# Patient Record
Sex: MALE | Race: WHITE | NOT HISPANIC OR LATINO | Employment: FULL TIME | URBAN - METROPOLITAN AREA
[De-identification: names, ages, dates, MRNs, and addresses within clinical notes are randomized per-mention and may not be internally consistent; named-entity substitution may affect disease eponyms.]

---

## 2019-02-14 ENCOUNTER — TREATMENT (OUTPATIENT)
Dept: PHYSICAL THERAPY | Facility: CLINIC | Age: 44
End: 2019-02-14

## 2019-02-14 DIAGNOSIS — M25.511 ACUTE PAIN OF RIGHT SHOULDER: Primary | ICD-10-CM

## 2019-02-14 DIAGNOSIS — M75.41 SHOULDER IMPINGEMENT SYNDROME, RIGHT: ICD-10-CM

## 2019-02-14 DIAGNOSIS — M75.21 BICEPS TENDINITIS OF RIGHT SHOULDER: ICD-10-CM

## 2019-02-14 PROCEDURE — DRYNDL PR CUSTOM DRY NEEDLING SELF PAY: Performed by: PHYSICAL THERAPIST

## 2019-02-14 PROCEDURE — 97161 PT EVAL LOW COMPLEX 20 MIN: CPT | Performed by: PHYSICAL THERAPIST

## 2019-02-14 PROCEDURE — 97530 THERAPEUTIC ACTIVITIES: CPT | Performed by: PHYSICAL THERAPIST

## 2019-02-14 NOTE — PROGRESS NOTES
Physical Therapy Initial Evaluation and Plan of Care    Patient: Jose Barber   : 1975  Diagnosis/ICD-10 Code:  Acute pain of right shoulder [M25.511]  Referring practitioner: Self Referring    Subjective Evaluation    History of Present Illness  Date of onset: 2018  Mechanism of injury: Pt reports doing a lot of burpees, pull-ups, and push-ups in Performance Training class and then helped sister move in the same day.  Pt reports moving a dresser from the third drawer and tried to catch it from falling.      PMH: broke right collar bone 3 years ago; TIA      Precautions and Work Restrictions: pressing with less weightPain  Current pain rating: 3  At best pain ratin (complete rest)  At worst pain ratin  Location: anterior right shoulder   Quality: dull ache  Relieving factors: rest (Aleve)  Exacerbated by: overhead presses, close- press.  Progression: improved    Hand dominance: right    Diagnostic Tests  No diagnostic tests performed    Patient Goals  Patient goals for therapy: decreased pain and return to sport/leisure activities             Objective       Tenderness     Right Shoulder  Tenderness in the biceps tendon (proximal). No tenderness in the acromion, clavicle, infraspinatus tendon, subscapularis tendon and supraspinatus tendon.     Active Range of Motion     Right Shoulder   Abduction: Right shoulder active abduction: pre-dry needlin degrees with pain; post-dry needling 180 deg with minimal discomfort.     Strength/Myotome Testing     Right Shoulder     Planes of Motion   Flexion: 5   Abduction: 4+   External rotation at 45°: 5   Internal rotation at 45°: 5     Tests     Right Shoulder   Positive jerk and Priya.   Negative AC shear, active compression (Clayton), empty can, Hawkin's and passive horizontal adduction.     Additional Tests Details  Biceps Load and Biceps Load II (+) Right         Assessment & Plan     Assessment  Impairments: abnormal or restricted ROM, impaired  physical strength, lacks appropriate home exercise program and pain with function  Assessment details: Pt is active 43 y.o. Male who presents to physical therapy with right shoulder pain since December.  Pt demonstrated limitation in AROM shoulder abduction prior to dry needling, pain with shoulder flexion and abduction and pain with labral special testing.  Pt education on pathology and involved anatomy and recommendations made to hold on overhead pressing at this time.  Pt education on modifying exercises that increase pain and increased attention to scapular stabilization exercises.  Pt requires continued skilled physical therapy to address impairments and return to prior level of function including lifting at work and weight lifting without increased pain or difficulty.  Functional Limitations: carrying objects, lifting, pulling, pushing and uncomfortable because of pain  Goals  Plan Goals: Short - Term Goals - In 2 weeks:  1. Pt will be (I) with initial HEP.  2. Right shoulder AROM flexion and abduction 180 degrees without pain in order to perform overhead reaching and lifting.    Long - Term Goals - In 4 weeks:  1. Right shoulder flexion and abduction 5/5 without pain.  2. Pt will resume normal weight lifting with all movements and normal weight without increased pain during or after activity.  3. QuickDASH score 0% to demonstrate functional improvement.     Plan  Therapy options: will be seen for skilled physical therapy services  Planned modality interventions: cryotherapy, electrical stimulation/Russian stimulation and thermotherapy (hydrocollator packs)  Planned therapy interventions: body mechanics training, functional ROM exercises, home exercise program, manual therapy, neuromuscular re-education, strengthening and therapeutic activities  Duration in visits: 6  Treatment plan discussed with: patient        Manual Therapy:    -     mins  14865;  Therapeutic Exercise:    -     mins  19028;      Neuromuscular Evelia:    -    mins  31518;    Therapeutic Activity:     10     mins  63388;     Gait Training:      -     mins  26298;     Ultrasound:     -     mins  95596;    Electrical Stimulation:    -     mins  52850 ( );  Dry Needling     10     mins self-pay    Timed Treatment:   10   mins   Total Treatment:     40   mins    PT SIGNATURE: Alessandra Marinelli, PT DPT   DATE TREATMENT INITIATED: 2/15/2019    Initial Certification  Certification Period: 5/16/2019  I certify that the therapy services are furnished while this patient is under my care.  The services outlined above are required by this patient, and will be reviewed every 90 days.     PHYSICIAN:       DATE:     Please sign and return via fax to [phone]. Thank you, [facility].

## 2019-02-15 NOTE — PATIENT INSTRUCTIONS
Please view [facility] Jose Barber for a complete list of HEP instructions.  Dry needling consent reviewed and signed; side effects discussed and agreed to

## 2019-02-27 ENCOUNTER — TREATMENT (OUTPATIENT)
Dept: PHYSICAL THERAPY | Facility: CLINIC | Age: 44
End: 2019-02-27

## 2019-02-27 DIAGNOSIS — M25.511 ACUTE PAIN OF RIGHT SHOULDER: Primary | ICD-10-CM

## 2019-02-27 DIAGNOSIS — M75.21 BICEPS TENDINITIS OF RIGHT SHOULDER: ICD-10-CM

## 2019-02-27 DIAGNOSIS — M75.41 SHOULDER IMPINGEMENT SYNDROME, RIGHT: ICD-10-CM

## 2019-02-27 PROCEDURE — 97014 ELECTRIC STIMULATION THERAPY: CPT | Performed by: PHYSICAL THERAPIST

## 2019-02-27 PROCEDURE — DRYNDL PR CUSTOM DRY NEEDLING SELF PAY: Performed by: PHYSICAL THERAPIST

## 2019-02-27 PROCEDURE — 97140 MANUAL THERAPY 1/> REGIONS: CPT | Performed by: PHYSICAL THERAPIST

## 2019-02-27 PROCEDURE — 97110 THERAPEUTIC EXERCISES: CPT | Performed by: PHYSICAL THERAPIST

## 2019-02-27 NOTE — PROGRESS NOTES
Physical Therapy Daily Progress Note    Jose Barber reports: shoulder is about the same and will feel ok, but occasionally increased anterior shoulder pain after workouts.      Subjective     Objective       Tenderness     Right Shoulder  Tenderness in the biceps tendon (proximal), bicipital groove and subscapularis tendon.     Passive Range of Motion     Right Shoulder   Flexion: 180 degrees   Abduction: 180 degrees      See Exercise, Manual, and Modality Logs for complete treatment.       Assessment & Plan     Assessment  Assessment details: Pt demonstrates continued c/o pain and associated tenderness at right biceps tendon and supraspinatus tendon.  Pt demonstrates full shoulder PROM, but continued pain and limitations with right shoulder active and resisted activities.  Initiated additional scapular strengthening and stabilization exercises including serratus anterior activation.  Pt tolerated treatment well without increased pain and dry needling continued due to previous favorable response.        Progress strengthening /stabilization /functional activity           Manual Therapy:    10     mins  91240;  Therapeutic Exercise:    15     mins  77592;     Neuromuscular Evelia:    -    mins  22025;    Therapeutic Activity:     -     mins  60237;     Gait Training:      -     mins  78800;     Ultrasound:     -     mins  05802;    Electrical Stimulation:    15     mins  97415 ( );  Dry Needling     10     mins self-pay    Timed Treatment:   25   mins   Total Treatment:     50   mins    Alessandra Marinelli, PT DPT  Physical Therapist

## 2019-03-08 ENCOUNTER — TREATMENT (OUTPATIENT)
Dept: PHYSICAL THERAPY | Facility: CLINIC | Age: 44
End: 2019-03-08

## 2019-03-08 DIAGNOSIS — M25.511 ACUTE PAIN OF RIGHT SHOULDER: Primary | ICD-10-CM

## 2019-03-08 DIAGNOSIS — M75.41 SHOULDER IMPINGEMENT SYNDROME, RIGHT: ICD-10-CM

## 2019-03-08 DIAGNOSIS — M75.21 BICEPS TENDINITIS OF RIGHT SHOULDER: ICD-10-CM

## 2019-03-08 PROCEDURE — 97140 MANUAL THERAPY 1/> REGIONS: CPT | Performed by: PHYSICAL THERAPIST

## 2019-03-08 PROCEDURE — 97112 NEUROMUSCULAR REEDUCATION: CPT | Performed by: PHYSICAL THERAPIST

## 2019-03-08 PROCEDURE — 97014 ELECTRIC STIMULATION THERAPY: CPT | Performed by: PHYSICAL THERAPIST

## 2019-03-08 PROCEDURE — 97110 THERAPEUTIC EXERCISES: CPT | Performed by: PHYSICAL THERAPIST

## 2019-03-08 PROCEDURE — DRYNDL PR CUSTOM DRY NEEDLING SELF PAY: Performed by: PHYSICAL THERAPIST

## 2019-03-08 NOTE — PROGRESS NOTES
Physical Therapy Daily Progress Note    Jose Sunil reports: felt good after last session but pain started again after workouts.  Pt reports pain is still in anterior shoulder.      Subjective     Objective       Tenderness     Right Shoulder  Tenderness in the acromion and biceps tendon (proximal).      See Exercise, Manual, and Modality Logs for complete treatment.       Assessment & Plan     Assessment  Assessment details: Initiated additional scapular strengthening and shoulder strengthening today.  Pt demonstrated mild fatigue and difficulty and no increased pain.  Pt is responding well to physical therapy with short - term gains, but limited ability to maintain gains inter-session due to exercise activity.  Pt demonstrates some ability to modify strength training to decrease stress on right shoulder.        Progress strengthening /stabilization /functional activity  Update HEP as indicated         Manual Therapy:    10     mins  62427;  Therapeutic Exercise:   10      mins  86020;     Neuromuscular Evelia:    10    mins  31663;    Therapeutic Activity:     -     mins  75625;     Gait Training:      -     mins  70919;     Ultrasound:     -     mins  33565;    Electrical Stimulation:    15     mins  77314 ( );  Dry Needling     10     mins self-pay    Timed Treatment:   30   mins   Total Treatment:     55   mins    Alessandra Marinelli, PT DPT  Physical Therapist

## 2019-03-15 ENCOUNTER — TREATMENT (OUTPATIENT)
Dept: PHYSICAL THERAPY | Facility: CLINIC | Age: 44
End: 2019-03-15

## 2019-03-15 DIAGNOSIS — M75.41 SHOULDER IMPINGEMENT SYNDROME, RIGHT: ICD-10-CM

## 2019-03-15 DIAGNOSIS — M75.21 BICEPS TENDINITIS OF RIGHT SHOULDER: ICD-10-CM

## 2019-03-15 DIAGNOSIS — M25.511 ACUTE PAIN OF RIGHT SHOULDER: Primary | ICD-10-CM

## 2019-03-15 PROCEDURE — DRYNDL PR CUSTOM DRY NEEDLING SELF PAY: Performed by: PHYSICAL THERAPIST

## 2019-03-15 PROCEDURE — 97112 NEUROMUSCULAR REEDUCATION: CPT | Performed by: PHYSICAL THERAPIST

## 2019-03-15 PROCEDURE — 97014 ELECTRIC STIMULATION THERAPY: CPT | Performed by: PHYSICAL THERAPIST

## 2019-03-15 PROCEDURE — 97110 THERAPEUTIC EXERCISES: CPT | Performed by: PHYSICAL THERAPIST

## 2019-03-15 NOTE — PROGRESS NOTES
Physical Therapy Daily Progress Note    Jose Barber reports: doing well, still pain in anterior right shoulder and sometimes in right upper trapezius at night.     Subjective     Objective   See Exercise, Manual, and Modality Logs for complete treatment.       Assessment & Plan     Assessment  Assessment details: Treatment session focused on scapular strengthening and stabilization.  Pt tolerated treatment session well and required only minimal cues for scapular retraction and depression.  Pt demonstrates good progress towards goals and decreased intensity and frequency of pain.          Progress strengthening /stabilization /functional activity  Re-assess objective measures         Manual Therapy:    -     mins  54288;  Therapeutic Exercise:    22     mins  25446;     Neuromuscular Evelia:    8    mins  76838;    Therapeutic Activity:     -     mins  36700;     Gait Training:      -     mins  18147;     Ultrasound:     -     mins  51041;    Electrical Stimulation:    15     mins  53560 ( );  Dry Needling     10     mins self-pay    Timed Treatment:   30   mins   Total Treatment:     55   mins    Alessandra Marinelli, PT DPT  Physical Therapist

## 2019-03-22 ENCOUNTER — TREATMENT (OUTPATIENT)
Dept: PHYSICAL THERAPY | Facility: CLINIC | Age: 44
End: 2019-03-22

## 2019-03-22 DIAGNOSIS — M75.21 BICEPS TENDINITIS OF RIGHT SHOULDER: ICD-10-CM

## 2019-03-22 DIAGNOSIS — M75.41 SHOULDER IMPINGEMENT SYNDROME, RIGHT: ICD-10-CM

## 2019-03-22 DIAGNOSIS — M25.511 ACUTE PAIN OF RIGHT SHOULDER: Primary | ICD-10-CM

## 2019-03-22 PROCEDURE — 97110 THERAPEUTIC EXERCISES: CPT | Performed by: PHYSICAL THERAPIST

## 2019-03-22 PROCEDURE — 97140 MANUAL THERAPY 1/> REGIONS: CPT | Performed by: PHYSICAL THERAPIST

## 2019-03-22 PROCEDURE — DRYNDL PR CUSTOM DRY NEEDLING SELF PAY: Performed by: PHYSICAL THERAPIST

## 2019-03-22 PROCEDURE — 97014 ELECTRIC STIMULATION THERAPY: CPT | Performed by: PHYSICAL THERAPIST

## 2019-03-22 NOTE — PROGRESS NOTES
Physical Therapy Daily Progress Note    Jose Sunil reports: moved a lot of weight during home repairs and overall shoulder is doing well.  Pt reports occasional pain on top of right shoulder.      Subjective     Objective       Tenderness     Right Shoulder  Tenderness in the biceps tendon (proximal).      See Exercise, Manual, and Modality Logs for complete treatment.       Assessment & Plan     Assessment  Assessment details: Pt demonstrates decreased right shoulder pain and improved tolerance to activity.  Pt education on core training and planes of motion as well as stability exercises to activate and strengthen local core muscles.  Pt demonstrates good understanding and progress and is appropriate for decreased frequency of PT visits to as needed with focus on manual therapy.          Progress per Plan of Care  Focus on manual therapy with continued HEP performance         Manual Therapy:    10     mins  31117;  Therapeutic Exercise:    10     mins  61800;     Neuromuscular Evelia:    -    mins  82559;    Therapeutic Activity:     -     mins  89495;     Gait Training:      -     mins  58379;     Ultrasound:     -     mins  04793;    Electrical Stimulation:    15     mins  30114 ( );  Dry Needling     10     mins self-pay    Timed Treatment:   20   mins   Total Treatment:     45   mins    Alessandra Marinelli, PT DPT  Physical Therapist

## 2019-12-20 ENCOUNTER — OFFICE VISIT (OUTPATIENT)
Dept: FAMILY MEDICINE CLINIC | Facility: CLINIC | Age: 44
End: 2019-12-20

## 2019-12-20 VITALS
HEART RATE: 68 BPM | OXYGEN SATURATION: 98 % | BODY MASS INDEX: 28.29 KG/M2 | HEIGHT: 69 IN | SYSTOLIC BLOOD PRESSURE: 141 MMHG | DIASTOLIC BLOOD PRESSURE: 89 MMHG | WEIGHT: 191 LBS | RESPIRATION RATE: 16 BRPM | TEMPERATURE: 98.6 F

## 2019-12-20 DIAGNOSIS — I74.9 TIA DUE TO EMBOLISM (HCC): Primary | ICD-10-CM

## 2019-12-20 DIAGNOSIS — Z23 ENCOUNTER FOR ADMINISTRATION OF VACCINE: ICD-10-CM

## 2019-12-20 DIAGNOSIS — I77.74 VERTEBRAL ARTERY DISSECTION (HCC): ICD-10-CM

## 2019-12-20 DIAGNOSIS — I10 HYPERTENSION, UNSPECIFIED TYPE: ICD-10-CM

## 2019-12-20 DIAGNOSIS — G45.9 TIA DUE TO EMBOLISM (HCC): Primary | ICD-10-CM

## 2019-12-20 PROBLEM — Z98.890 S/P ACL REPAIR: Status: ACTIVE | Noted: 2018-04-02

## 2019-12-20 PROCEDURE — 90632 HEPA VACCINE ADULT IM: CPT | Performed by: FAMILY MEDICINE

## 2019-12-20 PROCEDURE — 90471 IMMUNIZATION ADMIN: CPT | Performed by: FAMILY MEDICINE

## 2019-12-20 PROCEDURE — 99203 OFFICE O/P NEW LOW 30 MIN: CPT | Performed by: FAMILY MEDICINE

## 2019-12-20 RX ORDER — DESONIDE 0.5 MG/G
CREAM TOPICAL 2 TIMES DAILY
COMMUNITY
Start: 2019-09-30

## 2019-12-20 RX ORDER — PIMECROLIMUS 10 MG/G
CREAM TOPICAL 2 TIMES DAILY
COMMUNITY
Start: 2019-09-30 | End: 2020-05-07 | Stop reason: SDUPTHER

## 2019-12-20 RX ORDER — ASPIRIN 81 MG/1
81 TABLET ORAL DAILY
COMMUNITY

## 2019-12-20 RX ORDER — PIMECROLIMUS 10 MG/G
CREAM TOPICAL
COMMUNITY
Start: 2018-02-13

## 2020-01-21 ENCOUNTER — OFFICE VISIT (OUTPATIENT)
Dept: NEUROLOGY | Facility: CLINIC | Age: 45
End: 2020-01-21

## 2020-01-21 VITALS
BODY MASS INDEX: 28.14 KG/M2 | DIASTOLIC BLOOD PRESSURE: 88 MMHG | WEIGHT: 190 LBS | SYSTOLIC BLOOD PRESSURE: 128 MMHG | OXYGEN SATURATION: 98 % | HEIGHT: 69 IN | HEART RATE: 75 BPM

## 2020-01-21 DIAGNOSIS — I74.9 TIA DUE TO EMBOLISM (HCC): ICD-10-CM

## 2020-01-21 DIAGNOSIS — G45.9 TIA DUE TO EMBOLISM (HCC): ICD-10-CM

## 2020-01-21 DIAGNOSIS — I77.74 VERTEBRAL ARTERY DISSECTION (HCC): ICD-10-CM

## 2020-01-21 DIAGNOSIS — G45.9 TIA (TRANSIENT ISCHEMIC ATTACK): Primary | ICD-10-CM

## 2020-01-21 PROCEDURE — 99204 OFFICE O/P NEW MOD 45 MIN: CPT | Performed by: NURSE PRACTITIONER

## 2020-01-21 RX ORDER — CLOBETASOL PROPIONATE 0.5 MG/G
CREAM TOPICAL 2 TIMES DAILY
COMMUNITY
Start: 2020-01-13

## 2020-01-21 NOTE — PATIENT INSTRUCTIONS
"Transient Ischemic Attack  A transient ischemic attack (TIA) is a \"warning stroke\" that causes stroke-like symptoms that then go away quickly. The symptoms of a TIA come on suddenly, and they last less than 24 hours. Unlike a stroke, a TIA does not cause permanent damage to the brain. It is important to know the symptoms of a TIA and what to do. Seek medical care right away, even if your symptoms go away.  Having a TIA is a sign that you are at higher risk for a permanent stroke. Lifestyle changes and medical treatments can help prevent a stroke.  What are the causes?  This condition is caused by a temporary blockage in an artery in the head or neck. The blockage does not allow the brain to get the blood supply it needs and can cause various symptoms. The blockage can be caused by:  · Fatty buildup in an artery in the head or neck (atherosclerosis).  · A blood clot.  · Tearing of an artery (dissection).  · Inflammation of an artery (vasculitis).  Sometimes the cause is not known.  What increases the risk?  Certain factors may make you more likely to develop this condition. Some of these factors are things that you can change, such as:  · Obesity.  · Using products that contain nicotine or tobacco, such as cigarettes and e-cigarettes.  · Taking oral birth control, especially if you also use tobacco.  · Lack of physical inactivity.  · Excessive use of alcohol.  · Use of drugs, especially cocaine and methamphetamine.  Other risk factors include:  · High blood pressure (hypertension).  · High cholesterol.  · Diabetes mellitus.  · Heart disease (coronary artery disease).  · Atrial fibrillation.  · Being  or .  · Being over the age of 60.  · Being male.  · Family history of stroke.  · Previous history of blood clots, stroke, TIA, or heart attack.  · Sickle cell disease.  · Being a woman with a history of preeclampsia.  · Migraine headache.  · Sleep apnea.  · Chronic inflammatory diseases, such as " rheumatoid arthritis or lupus.  · Blood clotting disorders (hypercoagulable state).  What are the signs or symptoms?  Symptoms of this condition are the same as those of a stroke, but they are temporary. The symptoms develop suddenly, and they go away quickly, usually within minutes to hours. Symptoms may include sudden:  · Weakness or numbness in your face, arm, or leg, especially on one side of your body.  · Trouble walking or difficulty moving your arms or legs.  · Trouble speaking, understanding speech, or both (aphasia).  · Vision changes in one or both eyes. These include double vision, blurred vision, or loss of vision.  · Dizziness.  · Confusion.  · Loss of balance or coordination.  · Nausea and vomiting.  · Severe headache with no known cause.  If possible, make note of the exact time that you last felt like your normal self and what time your symptoms started. Tell your health care provider.  How is this diagnosed?  This condition may be diagnosed based on:  · Your symptoms and medical history.  · A physical exam.  · Imaging tests, usually a CT or MRI scan of the brain.  · Blood tests.  You may also have other tests, including:  · Electrocardiogram (ECG).  · Echocardiogram.  · Carotid ultrasound.  · A scan of the brain circulation (CT angiogram or MRI angiogram).  · Continuous heart monitoring.  How is this treated?  The goal of treatment is to reduce the risk for a subsequent stroke. Treatment may include stroke prevention therapies such as:  · Changes to diet or lifestyle to decrease your risk. Lifestyle changes may include exercising and stopping smoking.  · Medicines to thin the blood (antiplatelets or anticoagulants).  · Blood pressure medicines.  · Medicines to reduce cholesterol.  · Treating other health conditions, such as diabetes or atrial fibrillation.  If testing shows that you have narrowing in the arteries to your brain, your health care provider may recommend a procedure, such as:  · Carotid  endarterectomy. This is a surgery to remove the blockage from your artery.  · Carotid angioplasty and stenting. This is a procedure to open or widen an artery in the neck using a metal mesh tube (stent). The stent helps keep the artery open by supporting the artery walls.  Follow these instructions at home:  Medicines    · Take over-the-counter and prescription medicines only as told by your health care provider.  · If you were told to take a medicine to thin your blood, such as aspirin or an anticoagulant, take it exactly as told by your health care provider.  ? Taking too much blood-thinning medicine can cause bleeding.  ? If you do not take enough blood-thinning medicine, you will not have the protection that you need against a stroke and other problems.  Eating and drinking    · Eat 5 or more servings of fruits and vegetables each day.  · Follow instructions from your health care provider about diet. You may need to follow a certain nutrition plan to help manage risk factors for stroke, such as high blood pressure, high cholesterol, diabetes, or obesity. This may include:  ? Eating a low-fat, low-salt diet.  ? Including a lot of fiber in your diet.  ? Limiting the amount of carbohydrates and sugar in your diet.  · Limit alcohol intake to no more than 1 drink a day for nonpregnant women and 2 drinks a day for men. One drink equals 12 oz of beer, 5 oz of wine, or 1½ oz of hard liquor.  General instructions  · Maintain a healthy weight.  · Stay physically active. Try to get at least 30 minutes of exercise on most or all days.  · Find out if you have sleep apnea, and seek treatment if needed.  · Do not use any products that contain nicotine or tobacco, such as cigarettes and e-cigarettes. If you need help quitting, ask your health care provider.  · Do not abuse drugs.  · Keep all follow-up visits as told by your health care provider. This is important.  Where to find more information  · American Stroke Association:  www.strokeassociation.org  · National Stroke Association: www.stroke.org  Get help right away if:  · You have chest pain or an irregular heartbeat.  · You have any symptoms of stroke. The acronym BEFAST is an easy way to remember the main warning signs of stroke.  ? B - Balance problems. Signs include dizziness, sudden trouble walking, or loss of balance.  ? E - Eye problems. This includes trouble seeing or a sudden change in vision.  ? F - Face changes. This includes sudden weakness or numbness of the face, or the face or eyelid drooping to one side.  ? A - Arm weakness or numbness. This happens suddenly and usually on one side of the body.  ? S - Speech problems. This includes trouble speaking or trouble understanding speech.  ? T - Time. Time to call 911 or seek emergency care. Do not wait to see if symptoms will go away. Make note of the time your symptoms started.  · Other signs of stroke may include:  ? A sudden, severe headache with no known cause.  ? Nausea or vomiting.  ? Seizure.  These symptoms may represent a serious problem that is an emergency. Do not wait to see if the symptoms will go away. Get medical help right away. Call your local emergency services (911 in the [place] Do not drive yourself to the hospital.  Summary  · A TIA happens when an artery in the head or neck is blocked, leading to stroke-like symptoms that then go away quickly. The blockage clears before there is any permanent brain damage. A TIA is a medical emergency and requires immediate medical attention.  · Symptoms of this condition are the same as those of a stroke, but they are temporary. The symptoms usually develop suddenly, and they go away quickly, usually within minutes to hours.  · Having a TIA means that you are at high risk of a stroke in the near future.  · Treatment may include medicines to thin the blood as well as medicines, diet changes, and lifestyle changes to manage conditions that increase the risk of another TIA  or a stroke.  This information is not intended to replace advice given to you by your health care provider. Make sure you discuss any questions you have with your health care provider.  Document Released: 09/27/2006 Document Revised: 06/25/2019 Document Reviewed: 01/30/2018  [employer] Interactive Patient Education © 2019 [employer]

## 2020-01-21 NOTE — PROGRESS NOTES
"DOS: 2020  NAME: Jose Barber   : 1975  PCP: Jean-Paul Levy MD    Chief Complaint   Patient presents with   • Transient Ischemic Attack      SUBJECTIVE  Neurological Problem:  44 y.o. RHW  male with \"borderline\" HTN, HLD, h/o basal cell carcinoma who presents today to establish care for stroke/TIA. He is unaccompanied. Patient and problem are new to examiner. History is provided by patient and review of records that are summarized below.       Interval History:   Mr. Barber reports he had a TIA in 2018, treated at [facility], no records currently available. He tells me he  was at a festival in [place], wearing be gras type beads, on way back to [place], he was, sleeping on plane, and on rough landing, his head snapped forward, work him up but \"couldn't see straight for 24-hours\", mild headache. Spouse reports he wasn't acting himself but thought was due to his trip. The following day he went to his regular cardio/weight class, had a nose bleed, was cleaning up and started choking, had a severe coughing spell and then went weak on the whole left side. He was taken to [facility] and symptoms had basically resolved except for some speech difficulties for the next several hours, he was sent by EMS to [facility] where he had several scans and was apparently seen by Dr. Cobb, endovascular neurologist, no hospital records currently available but f/u office notes 19 indicate he was diagnosed with vertebral artery dissection, hyperlipidemia and bilateral carotid artery stenosis. Per patient follow-up imaging showed resolution of the \"clot\". He has been on ASA since the event which he continues and was on a statin at one time, but not currently. Patient states he was told his event was due to an \"inherited disease\". He has recently established with a new PCP, progress notes dated 19 reviewed, he had questions regarding his diagnosis of TIA resulting in referral [facility].     He denies any " "residual symptoms or any previous or new stroke/TIA symptoms. He denies any headaches. He works out regularly. Denies any changes in his health. He does take his BP regularly, states he runs about the \"130s\", He denies any signs/symptoms of sleep apnea. No history of seizures, CNS infections. Remote h/o  \"cracked head open\" on pogo stick, no LOC, no concussion. He denies migraines.     He denies any family h/o blood or connective tissue disorders, stroke/TIA. Grandfathers that  of heart disease in 60s  He denies smoking. Rare alcohol use.     Review of Systems:Review of Systems   Constitutional: Negative for activity change, appetite change and fatigue.   HENT: Negative for ear pain, facial swelling and trouble swallowing.    Eyes: Negative for photophobia, pain and visual disturbance.   Respiratory: Negative for cough, chest tightness and shortness of breath.    Cardiovascular: Negative for chest pain, palpitations and leg swelling.   Gastrointestinal: Negative for abdominal pain, nausea and vomiting.   Endocrine: Negative for cold intolerance, heat intolerance and polydipsia.   Musculoskeletal: Negative for back pain, gait problem and neck pain.   Skin: Negative for color change, rash and wound.   Allergic/Immunologic: Negative for environmental allergies, food allergies and immunocompromised state.   Neurological: Negative for dizziness, tremors, seizures, syncope, facial asymmetry, speech difficulty, weakness, light-headedness, numbness and headaches.   Hematological: Negative for adenopathy. Does not bruise/bleed easily.   Psychiatric/Behavioral: Negative for agitation, behavioral problems, confusion, decreased concentration, dysphoric mood, hallucinations, self-injury, sleep disturbance and suicidal ideas. The patient is not nervous/anxious and is not hyperactive.     Above ROS reviewed    The following portions of the patient's history were reviewed and updated as appropriate: allergies, current " medications, past family history, past medical history, past social history, past surgical history and problem list.    Current Medications:   Current Outpatient Medications:   •  aspirin 81 MG EC tablet, Take 81 mg by mouth Daily., Disp: , Rfl:   •  clobetasol (TEMOVATE) 0.05 % cream, Apply  topically to the appropriate area as directed 2 (Two) Times a Day., Disp: , Rfl:   •  desonide (DESOWEN) 0.05 % cream, Apply  topically to the appropriate area as directed 2 (Two) Times a Day., Disp: , Rfl:   •  Multiple Vitamin (MULTI VITAMIN MENS PO), Take  by mouth., Disp: , Rfl:   •  pimecrolimus (ELIDEL) 1 % cream, Apply  topically to the appropriate area as directed 2 (Two) Times a Day., Disp: , Rfl:   •  TURMERIC PO, Take  by mouth Daily., Disp: , Rfl:   •  pimecrolimus (ELIDEL) 1 % cream, , Disp: , Rfl:       OBJECTIVE  Vitals:    01/21/20 1406   BP: 128/88   Pulse: 75   SpO2: 98%     Body mass index is 28.05 kg/m².    Diagnostics:    Laboratory Results:           Physical Examination:   General Appearance:   Well developed, well nourished, well groomed, alert, and cooperative.  HEENT: Normocephalic.    Neck and Spine: Normal range of motion.  Normal alignment. No mass or tenderness. No bruits.  Cardiac: Regular rate and rhythm.   Peripheral Vasculature: Radial pulses are equal and symmetric. No signs of distal embolization.  Extremities:    No edema or deformities. Normal joint ROM.  Skin:    No rashes or birth marks.  Psychiatric:    Normal mood and affect.    Neurological examination:  Higher Integrative  Function: Oriented to time, place and person. Normal registration, recall (3/3), attention span and concentration. Normal language including comprehension, spontaneous speech, repetition, reading, writing, naming and vocabulary. No neglect with normal visual-spatial function and construction. Normal fund of knowledge and higher integrative function.  CN II: Pupils are equal, round, and reactive to light. Normal  visual acuity and visual fields.    CN III IV VI: Extraocular movements are full without nystagmus.   CN V: Normal facial sensation and strength of muscles of mastication.  CN VII: Facial movements are symmetric. No weakness.  CN VIII:   Auditory acuity is normal.  CN IX & X:   Symmetric palatal movement.  CN XI: Sternocleidomastoid and trapezius are normal.  No weakness.  CN XII:   The tongue is midline.  No atrophy or fasciculations.  Motor: Normal muscle strength, bulk and tone in upper and lower extremities.  No fasciculations, rigidity, spasticity, or abnormal movements.  Reflexes: 2+ in the upper and lower extremities.   Sensation: Normal to light touch, pinprick, vibration, temperature, and proprioception in arms and legs. Normal graphesthesia and no extinction on DSS.  Station and Gait: Normal gait and station.    Coordination: Finger to nose test shows no dysmetria.  Heel to shin normal.    Impression:  Mr. Barber presents to establish care for TIA that occurred back in Feb 2018 that was presumably secondary to vertebral artery dissection, treated at [facility], records and scans requested. He has been treated appropriately with ASA and statin, although not currently on statin. Will check general stroke labs including a lipid panel, B12, TSH. For now recommend continuing ASA, will determine need for statin pending results of labwork. We also discussed the importance of dissection precautions, ie avoiding neck chiropractic manipulations. We reviewed the signs and symptoms of stroke and the importance of calling 911 if he were to experience these. He will f/u here in 3 months, sooner if symptoms warrant.     Plan:     Continue ASA 81 mg  Check Lipid panel, B12, TSH  Pending above results, consider Lipitor  Obtain hospital records, scans from [facility]  Dissection precautions -- avoid chiropractic neck manipulations, roller coasters, etc.   Secondary stroke prevention: Ideal targets for stroke prevention would be Blood  pressure < 130/80; B12 > 500 TSH in normal range and LDL < 70; HbA1c < 6.5 and smoking cessation if applicable.  Call 911 for stroke symptoms  Follow-up in 3 months    I spent 30 minutes face to face with patient, with  > 50% spent counseling patient regarding diagnosis, review of diagnostics, personal risk factors for stroke and importance of risk factor control for stroke prevention, including lifestyle modifications and preventative measures.     I spent 45 minutes face to face with patient, with  > 50% spent counseling patient regarding diagnosis, review of diagnostics, personal risk factors for stroke and importance of risk factor control for stroke prevention, including lifestyle modifications and preventative measures.   Jose was seen today for transient ischemic attack.    Diagnoses and all orders for this visit:    TIA (transient ischemic attack)  -     Lipid Panel; Future  -     Vitamin B12; Future  -     TSH; Future    TIA due to embolism (CMS/HCC)    Vertebral artery dissection (CMS/HCC)        Coding      Dictated using Dragon

## 2020-01-22 ENCOUNTER — LAB (OUTPATIENT)
Dept: LAB | Facility: HOSPITAL | Age: 45
End: 2020-01-22

## 2020-01-22 DIAGNOSIS — G45.9 TIA (TRANSIENT ISCHEMIC ATTACK): ICD-10-CM

## 2020-01-22 PROBLEM — E78.2 MIXED HYPERLIPIDEMIA: Status: ACTIVE | Noted: 2020-01-22

## 2020-01-22 LAB
CHOLEST SERPL-MCNC: 191 MG/DL (ref 0–200)
HDLC SERPL-MCNC: 53 MG/DL (ref 40–60)
LDLC SERPL CALC-MCNC: 116 MG/DL (ref 0–100)
LDLC/HDLC SERPL: 2.18 {RATIO}
TRIGL SERPL-MCNC: 111 MG/DL (ref 0–150)
TSH SERPL DL<=0.05 MIU/L-ACNC: 1.18 UIU/ML (ref 0.27–4.2)
VIT B12 BLD-MCNC: 734 PG/ML (ref 211–946)
VLDLC SERPL-MCNC: 22.2 MG/DL (ref 5–40)

## 2020-01-22 PROCEDURE — 80061 LIPID PANEL: CPT | Performed by: NURSE PRACTITIONER

## 2020-01-22 PROCEDURE — 84443 ASSAY THYROID STIM HORMONE: CPT

## 2020-01-22 PROCEDURE — 82607 VITAMIN B-12: CPT

## 2020-01-22 PROCEDURE — 36415 COLL VENOUS BLD VENIPUNCTURE: CPT

## 2020-01-27 ENCOUNTER — TELEPHONE (OUTPATIENT)
Dept: NEUROLOGY | Facility: CLINIC | Age: 45
End: 2020-01-27

## 2020-01-27 RX ORDER — ATORVASTATIN CALCIUM 10 MG/1
10 TABLET, FILM COATED ORAL DAILY
Qty: 30 TABLET | Refills: 11 | Status: SHIPPED | OUTPATIENT
Start: 2020-01-27 | End: 2021-01-25

## 2020-01-27 NOTE — TELEPHONE ENCOUNTER
----- Message from DEANA Cummings sent at 1/26/2020 10:19 PM EST -----  B12 and TSH were normal. Thanks.

## 2020-01-27 NOTE — TELEPHONE ENCOUNTER
I sent in script for Lipitor 10 mg. He will need a repeat lipiid panel in 6-8 weeks either through us or his PCP. Thanks.

## 2020-01-27 NOTE — TELEPHONE ENCOUNTER
----- Message from DEANA Cummings sent at 1/26/2020 10:17 PM EST -----  Can you please let patient know that his lipid panel shows that his LDL is over 100 and would recommend that he be on at least low dose statin. I can send in Lipitor to his pharmacy if he agrees. Thanks.

## 2020-01-27 NOTE — TELEPHONE ENCOUNTER
Discussed lab results with patient.    He states that he is willing to try a low dose of Lipitor, but he is concerned about taking a statin. He has taken Crestor in the past and states that it made him very dizzy and he was unable to function.

## 2020-01-28 DIAGNOSIS — E78.2 MIXED HYPERLIPIDEMIA: Primary | ICD-10-CM

## 2020-01-28 NOTE — TELEPHONE ENCOUNTER
Spoke with patient and informed him that rx was sent in and that he will need a repeat lipid panel in 6-8 weeks.    He states that it is easier for him if we could order it so he can go to the outpatient lab.  Thanks!

## 2020-05-05 ENCOUNTER — LAB (OUTPATIENT)
Dept: LAB | Facility: HOSPITAL | Age: 45
End: 2020-05-05

## 2020-05-05 DIAGNOSIS — E78.2 MIXED HYPERLIPIDEMIA: ICD-10-CM

## 2020-05-05 LAB
CHOLEST SERPL-MCNC: 123 MG/DL (ref 0–200)
HDLC SERPL-MCNC: 49 MG/DL (ref 40–60)
LDLC SERPL CALC-MCNC: 63 MG/DL (ref 0–100)
LDLC/HDLC SERPL: 1.29 {RATIO}
TRIGL SERPL-MCNC: 53 MG/DL (ref 0–150)
VLDLC SERPL-MCNC: 10.6 MG/DL (ref 5–40)

## 2020-05-05 PROCEDURE — 36415 COLL VENOUS BLD VENIPUNCTURE: CPT

## 2020-05-05 PROCEDURE — 80061 LIPID PANEL: CPT

## 2020-05-06 ENCOUNTER — TELEPHONE (OUTPATIENT)
Dept: NEUROLOGY | Facility: CLINIC | Age: 45
End: 2020-05-06

## 2020-05-06 ENCOUNTER — OFFICE VISIT (OUTPATIENT)
Dept: NEUROLOGY | Facility: CLINIC | Age: 45
End: 2020-05-06

## 2020-05-06 VITALS
HEART RATE: 67 BPM | WEIGHT: 173 LBS | OXYGEN SATURATION: 99 % | HEIGHT: 69 IN | BODY MASS INDEX: 25.62 KG/M2 | DIASTOLIC BLOOD PRESSURE: 90 MMHG | SYSTOLIC BLOOD PRESSURE: 150 MMHG

## 2020-05-06 DIAGNOSIS — I77.74 VERTEBRAL ARTERY DISSECTION (HCC): ICD-10-CM

## 2020-05-06 DIAGNOSIS — G45.9 TIA DUE TO EMBOLISM (HCC): Primary | ICD-10-CM

## 2020-05-06 DIAGNOSIS — E78.2 MIXED HYPERLIPIDEMIA: ICD-10-CM

## 2020-05-06 DIAGNOSIS — I74.9 TIA DUE TO EMBOLISM (HCC): Primary | ICD-10-CM

## 2020-05-06 PROCEDURE — 99215 OFFICE O/P EST HI 40 MIN: CPT | Performed by: NURSE PRACTITIONER

## 2020-05-06 NOTE — TELEPHONE ENCOUNTER
----- Message from DEANA Cummings sent at 5/5/2020  4:51 PM EDT -----  Please let patient know that his repeat lipid panel was improved and within goal, recommend continuing his low dose Lipitor.

## 2020-05-06 NOTE — PROGRESS NOTES
"DOS: 2020  NAME: Jose Barber   : 1975  PCP: Jean-Paul Levy MD    Chief Complaint   Patient presents with   • Transient Ischemic Attack      SUBJECTIVE  Neurological Problem:  45 y.o. RHW  male with \"borderline\" HTN, HLD, h/o basal cell carcinoma who presents today f/u of stroke/TIA. He is unaccompanied.     Interval History:   **For previous history, please progress note dated 20.    Mr. Barber initially seen in January for h/o TIA that occurred in 2018 that was presumably secondary to vertebral artery dissection, treated at [facility] [place], records and scan results reviewed, noted in chart. He has been since treated with ASA and statin.  Currently on Lipitor 10 mg with good improvement in his cholesterol, recent labs show an level 123, HDL 49, LDL 63, TG 53.  Labs done in January show a B12 of 734, TSH of 1.18.    He does report hitting his head last night, top right side, on garage door metal part when coming in the house from rain. He denies any LOC, no vision changes, no other neurologic symptoms. He denies HA today, no concussive type symptoms. He denies any other changes in his health since his last visit.  He has not been formally tested for sleep apnea, states he does snore when he is on his back.  He takes his BP regularly states it runs in the 120-130s/80-90s.  He normally exercises regularly but due to the pandemic and no access to gym, has not been exercising as frequently.    He denies any family h/o blood or connective tissue disorders, stroke/TIA. Grandfathers that  of heart disease in 60s  He denies smoking. Rare alcohol use.      Review of Systems:Review of Systems   Constitutional: Negative for activity change, appetite change and fatigue.   HENT: Negative for ear pain, facial swelling and trouble swallowing.    Eyes: Negative for photophobia, pain and visual disturbance.   Respiratory: Negative for cough, chest tightness and shortness of breath.    Musculoskeletal: Negative for " back pain, gait problem and neck pain.   Neurological: Negative for dizziness, tremors, seizures, syncope, facial asymmetry, speech difficulty, weakness, light-headedness, numbness and headaches.   Hematological: Negative for adenopathy. Does not bruise/bleed easily.   Psychiatric/Behavioral: Negative for agitation, behavioral problems, confusion, decreased concentration, dysphoric mood, hallucinations, self-injury, sleep disturbance and suicidal ideas. The patient is not nervous/anxious and is not hyperactive.     Above ROS reviewed    The following portions of the patient's history were reviewed and updated as appropriate: allergies, current medications, past family history, past medical history, past social history, past surgical history and problem list.    Current Medications:   Current Outpatient Medications:   •  aspirin 81 MG EC tablet, Take 81 mg by mouth Daily., Disp: , Rfl:   •  atorvastatin (LIPITOR) 10 MG tablet, Take 1 tablet by mouth Daily., Disp: 30 tablet, Rfl: 11  •  clobetasol (TEMOVATE) 0.05 % cream, Apply  topically to the appropriate area as directed 2 (Two) Times a Day., Disp: , Rfl:   •  desonide (DESOWEN) 0.05 % cream, Apply  topically to the appropriate area as directed 2 (Two) Times a Day., Disp: , Rfl:   •  Multiple Vitamin (MULTI VITAMIN MENS PO), Take  by mouth., Disp: , Rfl:   •  pimecrolimus (ELIDEL) 1 % cream, , Disp: , Rfl:   •  TURMERIC PO, Take  by mouth Daily., Disp: , Rfl:   •  pimecrolimus (ELIDEL) 1 % cream, Apply  topically to the appropriate area as directed 2 (Two) Times a Day., Disp: , Rfl:     OBJECTIVE  Vitals:    05/06/20 1505   BP: 150/90   Pulse: 67   SpO2: 99%     Body mass index is 25.54 kg/m².    Diagnostics:    Laboratory Results:         No results found for: WBC, HGB, HCT, MCV, PLT  No results found for: GLUCOSE, BUN, CREATININE, EGFRIFNONA, EGFRIFAFRI, BCR, K, CO2, CALCIUM, PROTENTOTREF, ALBUMIN, LABIL2, BILIRUBIN, AST, ALT  No results found for: HGBA1C  Lab  Results   Component Value Date    CHOL 123 05/05/2020    CHOL 191 01/22/2020     Lab Results   Component Value Date    HDL 49 05/05/2020    HDL 53 01/22/2020     Lab Results   Component Value Date    LDL 63 05/05/2020     (H) 01/22/2020     Lab Results   Component Value Date    TRIG 53 05/05/2020    TRIG 111 01/22/2020     No results found for: RPR  Lab Results   Component Value Date    TSH 1.180 01/22/2020     Lab Results   Component Value Date    [id number] 734 01/22/2020       Physical Examination:   General Appearance:   Well developed, well nourished, well groomed, alert, and cooperative.  HEENT: Normocephalic.  Bandage over right superior posterior scalp.  Neck and Spine: Normal range of motion.  Normal alignment. No mass or tenderness.   Cardiac: Regular rate and rhythm.   Peripheral Vasculature: Radial pulses are equal and symmetric. No signs of distal embolization.  Extremities:    No edema or deformities. Normal joint ROM.  Skin:    No rashes or birth marks.  Psychiatric:    Normal mood and affect.    Neurological examination:  Higher Integrative  Function: Oriented to time, place and person. Normal attention span and concentration. Normal language including comprehension, spontaneous speech and vocabulary. No neglect. Normal fund of knowledge and higher integrative function.  CN II: Pupils are equal, round, and reactive to light. Normal visual acuity and visual fields.    CN III IV VI: Extraocular movements are full without nystagmus.   CN V: Normal facial sensation and strength of muscles of mastication.  CN VII: Facial movements appear symmetric. No weakness observable.  CN VIII:   Auditory acuity is normal.  CN IX & X:   Symmetric palatal movement.  CN XI: Sternocleidomastoid and trapezius are normal.  No weakness.  CN XII:   The tongue is midline.  No atrophy or fasciculations.  Motor: Normal muscle strength, bulk and tone in upper and lower extremities.  No fasciculations, rigidity,  spasticity, or abnormal movements.  Reflexes: 1+ in the upper and lower extremities.   Sensation: Normal to light touch, vibration, temperature, and proprioception in arms and legs.   Station and Gait: Normal gait and station.    Coordination: Finger to nose test shows no dysmetria.  Heel to shin normal.    Impression:  Mr. Barber presents for follow-up of TIA secondary to vertebral artery dissection he suffered in February 2018, treated at [facility].  He has been since maintained on ASA plus statin therapy and is doing well, no neurologic changes.  We again reviewed risk factors for stroke and importance of dissection precautions.  We discussed the signs and symptoms of stroke with importance of calling 911 if he were to experience these.  Recommend he continue monitoring his blood pressure, staying well-hydrated.  If concerns of sleep apnea recommend testing for this.  He will follow-up here in 1 year, sooner if symptoms warrant.  He voiced understanding and agrees above plan.    Plan:     Continue ASA 81 mg, Lipitor 10 mg  F/U with PCP for continued cholesterol surveillance.  Monitor BP regularly, stay well-hydrated  Dissection precautions -- avoid chiropractic neck manipulations, roller coasters, etc.   If concerns for sleep apnea, recommend formal evaluation  Secondary stroke prevention: Ideal targets for stroke prevention would be Blood pressure < 130/80; B12 > 500 TSH in normal range and LDL < 70; HbA1c < 6.5 and smoking cessation if applicable.  Call 911 for stroke symptoms  Follow-up in one year      Greater than 50% of this 40-minute visit was spent counseling patient regarding diagnosis, review of diagnostics, personal risk factors for stroke and importance of risk factor control for stroke prevention, including lifestyle modifications and preventative measures.     Jose was seen today for transient ischemic attack.    Diagnoses and all orders for this visit:    TIA due to embolism (CMS/[facility])    Vertebral  artery dissection (CMS/HCC)    Mixed hyperlipidemia        Coding      Dictated using Dragon

## 2020-11-05 ENCOUNTER — OFFICE VISIT (OUTPATIENT)
Dept: SPORTS MEDICINE | Facility: CLINIC | Age: 45
End: 2020-11-05

## 2020-11-05 VITALS
SYSTOLIC BLOOD PRESSURE: 120 MMHG | WEIGHT: 173 LBS | DIASTOLIC BLOOD PRESSURE: 90 MMHG | TEMPERATURE: 97.3 F | BODY MASS INDEX: 25.62 KG/M2 | OXYGEN SATURATION: 99 % | HEART RATE: 78 BPM | HEIGHT: 69 IN

## 2020-11-05 DIAGNOSIS — M25.312 SHOULDER INSTABILITY, LEFT: ICD-10-CM

## 2020-11-05 DIAGNOSIS — M25.512 ACUTE PAIN OF LEFT SHOULDER: Primary | ICD-10-CM

## 2020-11-05 PROCEDURE — 73030 X-RAY EXAM OF SHOULDER: CPT | Performed by: FAMILY MEDICINE

## 2020-11-05 PROCEDURE — 99204 OFFICE O/P NEW MOD 45 MIN: CPT | Performed by: FAMILY MEDICINE

## 2020-11-05 NOTE — PROGRESS NOTES
"Jose is a 45 y.o. year old male    Chief Complaint   Patient presents with   • Shoulder Pain     Evaluation for LT shoulder pain - pain has been chronic but he did have an injury in July 2020 trying to load chairs - Referred to us from [facility] physical therapist - numbness and tingling reported at times, ROM is ok, but mildly limited - here today with new x-rays for further evaluation and treatment         History of Present Illness  Initial injury was in July when hosting birthday party in his backyard and loading chairs.  Right-hand-dominant.  Pain resolved relatively but was acutely exacerbated approximately 4 weeks ago.  He states that he felt immediate pain and numbness when trying to do incline bench press.  States that his shoulder gave out, causing valgus collapse.  He rested from workouts for 2 weeks and then return to gradual workouts 2 weeks ago.  Has been hesitant to do any overhead workouts due to the pain, relative instability.  He still associates numbness, especially upon sleeping.  Has intermittently tried ice, ibuprofen.  Has had dry needling to the anterior shoulder capsule by physical therapist.  Has also been doing formal PT, HEP.    I have reviewed the patient's medical, family, and social history in detail and updated the computerized patient record.    Review of Systems  Constitutional: Negative for fever.   Musculoskeletal:        Per HPI   Skin: Negative for rash.   Neurological: Negative for weakness and numbness.   Psychiatric/Behavioral: Negative for sleep disturbance.   All other systems reviewed and are negative.    /90 (BP Location: Left arm, Patient Position: Sitting, Cuff Size: Adult)   Pulse 78   Temp 97.3 °F (36.3 °C)   Ht 175.3 cm (69.02\")   Wt 78.5 kg (173 lb)   SpO2 99%   BMI 25.54 kg/m²      Physical Exam    Mask worn thru encounter  Vital signs reviewed.   General: No acute distress.  Eyes: conjunctiva clear; pupils equally round and reactive  ENT: external ears " atraumatic  CV: no peripheral edema  Resp: normal respiratory effort, no use of accessory muscles  Skin: no rashes or wounds; normal turgor  Psych: mood and affect appropriate; recent and remote memory intact  Neuro: sensation to light touch intact    MSK Exam:  L shoulder: Full passive range of motion.  Negative drop arm.  Negative pecan.  Negative Johns, negative belly press, negative liftoff.  Negative Speed and Yergason.  Negative apprehension test.    Left Shoulder X-Ray  Indication: Pain  Views: AP Internal and External Rotation    Findings:  No fracture  No bony lesion  Normal soft tissues  Normal joint spaces    No prior studies were available for comparison.    Diagnoses and all orders for this visit:    Acute pain of left shoulder  -     XR Shoulder 2+ View Left  -     MRI shoulder left wo contrast; Future    Shoulder instability, left  -     MRI shoulder left wo contrast; Future      Discussed normal x-ray and normal exam findings with Jose.  Despite this, I am concerned for labral tear versus partial rotator cuff tear.  I am recommending MRI.  We discussed nonoperative treatment options such as injection, PT.  We will set up telephone visit to discuss MRI results.    EMR Dragon/Transcription disclaimer:    Much of this encounter note is an electronic transcription/translation of spoken language to printed text.  The electronic translation of spoken language may permit erroneous, or at times, nonsensical words or phrases to be inadvertently transcribed.  Although I have reviewed the note for such errors some may still exist.

## 2020-11-25 ENCOUNTER — HOSPITAL ENCOUNTER (OUTPATIENT)
Dept: MRI IMAGING | Facility: HOSPITAL | Age: 45
Discharge: HOME | End: 2020-11-25
Admitting: FAMILY MEDICINE

## 2020-11-25 DIAGNOSIS — M25.312 SHOULDER INSTABILITY, LEFT: ICD-10-CM

## 2020-11-25 DIAGNOSIS — M25.512 ACUTE PAIN OF LEFT SHOULDER: ICD-10-CM

## 2020-11-25 PROCEDURE — 73221 MRI JOINT UPR EXTREM W/O DYE: CPT

## 2020-12-03 ENCOUNTER — OFFICE VISIT (OUTPATIENT)
Dept: SPORTS MEDICINE | Facility: CLINIC | Age: 45
End: 2020-12-03

## 2020-12-03 DIAGNOSIS — M25.312 SHOULDER INSTABILITY, LEFT: Primary | ICD-10-CM

## 2020-12-03 DIAGNOSIS — S46.012A TRAUMATIC COMPLETE TEAR OF LEFT ROTATOR CUFF, INITIAL ENCOUNTER: ICD-10-CM

## 2020-12-03 PROCEDURE — 99441 PR PHYS/QHP TELEPHONE EVALUATION 5-10 MIN: CPT | Performed by: FAMILY MEDICINE

## 2020-12-03 NOTE — PROGRESS NOTES
Telephone Visit Note    CC: MRI follow-up    History of Present Illness  No change in symptoms.  Significant pain that wakes him up from sleep.  Numbness.    Independent review of outside MRI left shoulder without contrast.  There is full-thickness tear with myotendinous retraction of the supraspinatus tendon approximately 1 cm proximal to its insertion.  Type II acromion with mild AC OA.      Diagnoses and all orders for this visit:    Shoulder instability, left  -     Ambulatory Referral to Orthopedic Surgery    Traumatic complete tear of left rotator cuff, initial encounter  -     Ambulatory Referral to Orthopedic Surgery      Given his symptoms and high-level functioning, I recommend surgical consultation.  All questions answered.    This patient was evaluated by telephone due to current precautions with COVID-19.  Consent to telephone visit for this problem was given by the patient. I spent a total of 8 minutes on the phone with the patient.

## 2020-12-10 ENCOUNTER — OFFICE VISIT (OUTPATIENT)
Dept: ORTHOPEDIC SURGERY | Facility: CLINIC | Age: 45
End: 2020-12-10

## 2020-12-10 VITALS
DIASTOLIC BLOOD PRESSURE: 80 MMHG | BODY MASS INDEX: 25.62 KG/M2 | WEIGHT: 173 LBS | HEIGHT: 69 IN | SYSTOLIC BLOOD PRESSURE: 138 MMHG

## 2020-12-10 DIAGNOSIS — M75.42 SUBACROMIAL IMPINGEMENT OF LEFT SHOULDER: ICD-10-CM

## 2020-12-10 DIAGNOSIS — M75.22 BICEPS TENDINITIS OF LEFT UPPER EXTREMITY: ICD-10-CM

## 2020-12-10 DIAGNOSIS — M75.122 COMPLETE TEAR OF LEFT ROTATOR CUFF, UNSPECIFIED WHETHER TRAUMATIC: Primary | ICD-10-CM

## 2020-12-10 PROCEDURE — 99204 OFFICE O/P NEW MOD 45 MIN: CPT | Performed by: ORTHOPAEDIC SURGERY

## 2020-12-10 NOTE — PROGRESS NOTES
Subjective:     Patient ID: Jose Barber is a 45 y.o. male.    Chief Complaint: left shoulder pain, new patient to provider    History of Present Illness  Jose Barber presents to clinic today for evaluation of his left shoulder. He states that he has had pain in the left shoulder for some time, but it has particular worsened in the past 4 months after lifting chairs at his daughter's graduation party in July 2020. He currently rates the pain as 3/10 with exacerbation up to 6-7/10, describes it as aching in nature. Localizes pain to the anterior and anterolateral aspects of the shoulder, with associated numbness in the left arm, especially at night.  Has noted improvement with physical therapy over the past 4-6 weeks, and with taking ibuprofen. Symptoms are exacerbated with overhead lifting and at night. Denies radiation of pain. Patient is right hand dominant.        Social History     Occupational History   • Not on file   Tobacco Use   • Smoking status: Never Smoker   • Smokeless tobacco: Never Used   Substance and Sexual Activity   • Alcohol use: Yes     Alcohol/week: 1.0 standard drinks     Types: 1 Cans of beer per week     Comment: 4-5 cokes a week   • Drug use: Never   • Sexual activity: Yes      Past Medical History:   Diagnosis Date   • TIA (transient ischemic attack)      Past Surgical History:   Procedure Laterality Date   • BASAL CELL CARCINOMA EXCISION         Family History   Problem Relation Age of Onset   • Melanoma Maternal Grandmother    • Cancer Maternal Grandmother    • Heart attack Maternal Grandfather    • Heart attack Paternal Grandfather    • Cancer Paternal Grandfather    • Migraines Mother          Review of Systems   Constitutional: Negative for chills, diaphoresis, fever and unexpected weight change.   HENT: Negative for hearing loss, nosebleeds, sore throat and tinnitus.    Eyes: Negative for pain and visual disturbance.   Respiratory: Negative for cough, shortness of breath and  "wheezing.    Cardiovascular: Negative for chest pain and palpitations.   Gastrointestinal: Negative for abdominal pain, diarrhea, nausea and vomiting.   Endocrine: Negative for cold intolerance, heat intolerance and polydipsia.   Genitourinary: Negative for difficulty urinating, dysuria and hematuria.   Musculoskeletal: Positive for myalgias. Negative for arthralgias.   Skin: Negative for rash and wound.   Allergic/Immunologic: Negative for environmental allergies.   Neurological: Negative for dizziness, syncope and numbness.   Hematological: Does not bruise/bleed easily.   Psychiatric/Behavioral: Negative for dysphoric mood and sleep disturbance. The patient is not nervous/anxious.            Objective:  Vitals:    12/10/20 1341   BP: 138/80   Weight: 78.5 kg (173 lb)   Height: 175.3 cm (69\")         12/10/20  1341   Weight: 78.5 kg (173 lb)     Body mass index is 25.55 kg/m².    Physical Exam    Vital signs reviewed.   General: No acute distress, alert and oriented  Eyes: conjunctiva clear; pupils equally round and reactive  ENT: external ears and nose atraumatic; oropharynx clear  CV: no peripheral edema  Resp: normal respiratory effort  Skin: no rashes or wounds; normal turgor  Psych: mood and affect appropriate; recent and remote memory intact        Ortho Exam       Left shoulder-  Tenderness  located anterior, anterolateral  FF-   Active- 175    Strength- 4+/5  ER-      Active- 75   Strength- 4/5  IR        Strength- 5/5 on belly press test  Bear hug sign-negative  Empty can test- negative    Neer's sign- negative  Johns- positive    Cross arm test- negative  Tenderness over AC joint- negative    O'briens- negative    Brisk cap refill to all digits, palpable radial pulse    Positive sensation to light touch palmar, dorsal aspects of small and index fingers and anatomic snuffbox left hand      Imaging:  Mri Shoulder Left Without Contrast    Result Date: 11/27/2020  Impression: 1. Full-thickness supraspinatus " tendon tear just proximal to its insertion with mild myotendinous retraction. 2. Type II acromial arch with acromial spur. Mild AC joint arthritis.  This report was finalized on 11/27/2020 8:05 AM by Dr. Gabino Lezama M.D.      Review of outside MRI left shoulder including review of image as well as radiology report indicates mild AC joint arthropathy with full-thickness rotator cuff tear of the supraspinatus with minimal retraction to the lateral third of the humeral head, no significant muscular atrophy appreciated.    Review of x-rays from sports medicine office of left shoulder indicate no evidence of fracture, dislocation, or subluxation, mild AC joint arthropathy appreciated.    Assessment:        1. Complete tear of left rotator cuff, unspecified whether traumatic    2. Biceps tendinitis of left upper extremity    3. Subacromial impingement of left shoulder           Plan:          1. Discussed treatment options at length with patient at today's visit including cortisone injections vs PRP injections vs arthroscopy with rotator cuff repair. Patient would like to proceed with surgical treatment at this time.   2. Plan will be for left shoulder arthroscopy, rotator cuff debridement versus repair with possible grafting, possible biceps tenodesis, subacromial decompression, and all associated procedures.  I reviewed risks benefits and alternatives the procedure with risks including not limited to neurovascular damage, bleeding, infection, chronic pain, re-tear rotator cuff, failure of healing rotator cuff, loss of motion, weakness, stiffness, instability, biceps sag, DVT, pulmonary embolus, death, stroke, complex regional pain syndrome, myocardial infarction, need for additional procedures. He understood all these had all questions answered.  Patient verbally consented to proceed with surgery.  No guarantees were given regarding results of surgery.  We will have patient medically optimized by primary care  physician and proceed with surgery at next available date.  3. Patient denies past medical history of cardiac issues or diabetes mellitus. Has a history of a TIA.       Jose Barber was in agreement with plan and had all questions answered.     Orders:  Orders Placed This Encounter   Procedures   • Basic metabolic panel   • Protime-INR   • APTT   • Follow anesthesia standing orders.   • Provide instructions to patient regarding NPO status   • CBC and Differential       Medications:  No orders of the defined types were placed in this encounter.      Followup:  No follow-ups on file.    Diagnoses and all orders for this visit:    1. Complete tear of left rotator cuff, unspecified whether traumatic (Primary)  -     Case Request; Standing  -     CBC and Differential; Future  -     Basic metabolic panel; Future  -     Protime-INR; Future  -     APTT; Future  -     acetaminophen (TYLENOL) tablet 975 mg  -     meloxicam (MOBIC) tablet 15 mg  -     pregabalin (LYRICA) capsule 150 mg  -     ceFAZolin (ANCEF) 2 g in sodium chloride 0.9 % 100 mL IVPB  -     Case Request    2. Biceps tendinitis of left upper extremity  -     Case Request; Standing  -     CBC and Differential; Future  -     Basic metabolic panel; Future  -     Protime-INR; Future  -     APTT; Future  -     acetaminophen (TYLENOL) tablet 975 mg  -     meloxicam (MOBIC) tablet 15 mg  -     pregabalin (LYRICA) capsule 150 mg  -     ceFAZolin (ANCEF) 2 g in sodium chloride 0.9 % 100 mL IVPB  -     Case Request    3. Subacromial impingement of left shoulder  -     Case Request; Standing  -     CBC and Differential; Future  -     Basic metabolic panel; Future  -     Protime-INR; Future  -     APTT; Future  -     acetaminophen (TYLENOL) tablet 975 mg  -     meloxicam (MOBIC) tablet 15 mg  -     pregabalin (LYRICA) capsule 150 mg  -     ceFAZolin (ANCEF) 2 g in sodium chloride 0.9 % 100 mL IVPB  -     Case Request    Other orders  -     Follow anesthesia standing  orders.  -     Provide instructions to patient regarding NPO status  -     Follow Anesthesia Guidelines / Standing Orders; Standing  -     Verify NPO Status; Standing  -     Clip operative site; Standing  -     Obtain informed consent (if not collected inpatient or PAT); Standing           By signing my name here, I Courteny Zepeda attest that all documentation on 12/15/20 at 09:13 EST has been prepared under the direction and in the presence of Dr. Pete Zuñiga MD.    I, Dr. Pete Zuñiga, personally performed the services described in this documentation, as scribed by Courtney Zepeda, in my presence, and it is both accurate and complete.        Dictated utilizing Dragon dictation

## 2020-12-15 RX ORDER — MELOXICAM 7.5 MG/1
15 TABLET ORAL ONCE
Status: CANCELLED | OUTPATIENT
Start: 2020-12-15 | End: 2020-12-15

## 2020-12-15 RX ORDER — PREGABALIN 150 MG/1
150 CAPSULE ORAL ONCE
Status: CANCELLED | OUTPATIENT
Start: 2020-12-15 | End: 2020-12-15

## 2020-12-15 RX ORDER — ACETAMINOPHEN 325 MG/1
1000 TABLET ORAL ONCE
Status: CANCELLED | OUTPATIENT
Start: 2020-12-15 | End: 2020-12-15

## 2020-12-20 ENCOUNTER — RESULTS ENCOUNTER (OUTPATIENT)
Dept: ORTHOPEDIC SURGERY | Facility: CLINIC | Age: 45
End: 2020-12-20

## 2020-12-20 DIAGNOSIS — M75.122 COMPLETE TEAR OF LEFT ROTATOR CUFF, UNSPECIFIED WHETHER TRAUMATIC: ICD-10-CM

## 2020-12-20 DIAGNOSIS — M75.22 BICEPS TENDINITIS OF LEFT UPPER EXTREMITY: ICD-10-CM

## 2020-12-20 DIAGNOSIS — M75.42 SUBACROMIAL IMPINGEMENT OF LEFT SHOULDER: ICD-10-CM

## 2020-12-21 ENCOUNTER — TELEPHONE (OUTPATIENT)
Dept: ORTHOPEDIC SURGERY | Facility: CLINIC | Age: 45
End: 2020-12-21

## 2020-12-21 ENCOUNTER — APPOINTMENT (OUTPATIENT)
Dept: PREADMISSION TESTING | Facility: HOSPITAL | Age: 45
End: 2020-12-21

## 2020-12-21 NOTE — TELEPHONE ENCOUNTER
Tested Covid + on 12/16/2020; patient surgery for December is cancelled, and he will be rescheduling in January.

## 2021-01-11 ENCOUNTER — OFFICE VISIT (OUTPATIENT)
Dept: FAMILY MEDICINE CLINIC | Facility: CLINIC | Age: 46
End: 2021-01-11

## 2021-01-11 VITALS
HEIGHT: 69 IN | HEART RATE: 80 BPM | SYSTOLIC BLOOD PRESSURE: 122 MMHG | RESPIRATION RATE: 20 BRPM | BODY MASS INDEX: 28.02 KG/M2 | WEIGHT: 189.2 LBS | DIASTOLIC BLOOD PRESSURE: 78 MMHG | TEMPERATURE: 98.2 F

## 2021-01-11 DIAGNOSIS — Z01.818 PRE-OPERATIVE CLEARANCE: Primary | ICD-10-CM

## 2021-01-11 PROCEDURE — 99213 OFFICE O/P EST LOW 20 MIN: CPT | Performed by: FAMILY MEDICINE

## 2021-01-11 NOTE — PROGRESS NOTES
Margarito Barber is a 45 y.o. male.     History of Present Illness     45-year-old male presents today for preop clearance for shoulder arthroscopy scheduled for 1/20/2021.  Patient has labs chest x-ray EKG scheduled for Friday.  Blood pressure in the office today 122/78.  Heart rate 80 respirations 20.  Takes aspirin and atorvastatin daily.        The following portions of the patient's history were reviewed and updated as appropriate: allergies, current medications, past family history, past medical history, past social history, past surgical history and problem list.    Review of Systems   Constitutional: Negative for chills and fever.   Respiratory: Negative for cough and shortness of breath.    Cardiovascular: Negative for chest pain, palpitations and leg swelling.   Gastrointestinal: Negative for abdominal pain, nausea and vomiting.       Objective   Physical Exam  Constitutional:       Appearance: He is well-developed.   HENT:      Head: Normocephalic and atraumatic.   Eyes:      Pupils: Pupils are equal, round, and reactive to light.   Neck:      Musculoskeletal: Normal range of motion and neck supple.   Cardiovascular:      Rate and Rhythm: Normal rate and regular rhythm.      Heart sounds: Normal heart sounds. No murmur.   Pulmonary:      Effort: Pulmonary effort is normal. No respiratory distress.      Breath sounds: Normal breath sounds. No stridor. No wheezing or rales.   Skin:     General: Skin is warm.   Neurological:      Mental Status: He is alert and oriented to person, place, and time.   Psychiatric:         Behavior: Behavior normal.           No results found for: COLORU, CLARITYU, SPECGRAV, PHUR, LEUKOCYTESUR, NITRITE, PROTEINPOCUA, GLUCOSEUR, KETONESU, UROBILINOGEN, BILIRUBINUR, RBCUR      Assessment/Plan     Diagnoses and all orders for this visit:    1. Pre-operative clearance (Primary)      Vitals normal today.  We will get labs and chest x-ray and other testing after Friday and  review results before officially clearing patient for surgery.

## 2021-01-13 ENCOUNTER — TRANSCRIBE ORDERS (OUTPATIENT)
Dept: ADMINISTRATIVE | Facility: HOSPITAL | Age: 46
End: 2021-01-13

## 2021-01-13 DIAGNOSIS — U07.1 COVID-19: Primary | ICD-10-CM

## 2021-01-15 ENCOUNTER — APPOINTMENT (OUTPATIENT)
Dept: PREADMISSION TESTING | Facility: HOSPITAL | Age: 46
End: 2021-01-15

## 2021-01-15 VITALS
OXYGEN SATURATION: 99 % | HEART RATE: 76 BPM | DIASTOLIC BLOOD PRESSURE: 96 MMHG | RESPIRATION RATE: 20 BRPM | SYSTOLIC BLOOD PRESSURE: 146 MMHG

## 2021-01-15 LAB
ANION GAP SERPL CALCULATED.3IONS-SCNC: 9.5 MMOL/L (ref 5–15)
APTT PPP: 30.5 SECONDS (ref 24.3–38.1)
BASOPHILS # BLD AUTO: 0.02 10*3/MM3 (ref 0–0.2)
BASOPHILS NFR BLD AUTO: 0.5 % (ref 0–1.5)
BUN SERPL-MCNC: 11 MG/DL (ref 6–20)
BUN/CREAT SERPL: 9.2 (ref 7–25)
CALCIUM SPEC-SCNC: 9 MG/DL (ref 8.6–10.5)
CHLORIDE SERPL-SCNC: 103 MMOL/L (ref 98–107)
CO2 SERPL-SCNC: 27.5 MMOL/L (ref 22–29)
CREAT SERPL-MCNC: 1.2 MG/DL (ref 0.76–1.27)
DEPRECATED RDW RBC AUTO: 45.8 FL (ref 37–54)
EOSINOPHIL # BLD AUTO: 0.06 10*3/MM3 (ref 0–0.4)
EOSINOPHIL NFR BLD AUTO: 1.4 % (ref 0.3–6.2)
ERYTHROCYTE [DISTWIDTH] IN BLOOD BY AUTOMATED COUNT: 12.9 % (ref 12.3–15.4)
GFR SERPL CREATININE-BSD FRML MDRD: 65 ML/MIN/1.73
GLUCOSE SERPL-MCNC: 126 MG/DL (ref 65–99)
HCT VFR BLD AUTO: 42.7 % (ref 37.5–51)
HGB BLD-MCNC: 14.5 G/DL (ref 13–17.7)
IMM GRANULOCYTES # BLD AUTO: 0 10*3/MM3 (ref 0–0.05)
IMM GRANULOCYTES NFR BLD AUTO: 0 % (ref 0–0.5)
INR PPP: 1.12 (ref 0.9–1.1)
LYMPHOCYTES # BLD AUTO: 1.94 10*3/MM3 (ref 0.7–3.1)
LYMPHOCYTES NFR BLD AUTO: 46.1 % (ref 19.6–45.3)
MCH RBC QN AUTO: 32.9 PG (ref 26.6–33)
MCHC RBC AUTO-ENTMCNC: 34 G/DL (ref 31.5–35.7)
MCV RBC AUTO: 96.8 FL (ref 79–97)
MONOCYTES # BLD AUTO: 0.32 10*3/MM3 (ref 0.1–0.9)
MONOCYTES NFR BLD AUTO: 7.6 % (ref 5–12)
NEUTROPHILS NFR BLD AUTO: 1.87 10*3/MM3 (ref 1.7–7)
NEUTROPHILS NFR BLD AUTO: 44.4 % (ref 42.7–76)
PLATELET # BLD AUTO: 171 10*3/MM3 (ref 140–450)
PMV BLD AUTO: 8.8 FL (ref 6–12)
POTASSIUM SERPL-SCNC: 4.3 MMOL/L (ref 3.5–5.2)
PROTHROMBIN TIME: 14.1 SECONDS (ref 12.1–15)
RBC # BLD AUTO: 4.41 10*6/MM3 (ref 4.14–5.8)
SODIUM SERPL-SCNC: 140 MMOL/L (ref 136–145)
WBC # BLD AUTO: 4.21 10*3/MM3 (ref 3.4–10.8)

## 2021-01-15 PROCEDURE — 85730 THROMBOPLASTIN TIME PARTIAL: CPT | Performed by: ORTHOPAEDIC SURGERY

## 2021-01-15 PROCEDURE — 85610 PROTHROMBIN TIME: CPT | Performed by: ORTHOPAEDIC SURGERY

## 2021-01-15 PROCEDURE — 36415 COLL VENOUS BLD VENIPUNCTURE: CPT | Performed by: ORTHOPAEDIC SURGERY

## 2021-01-15 PROCEDURE — 80048 BASIC METABOLIC PNL TOTAL CA: CPT | Performed by: ORTHOPAEDIC SURGERY

## 2021-01-15 PROCEDURE — 85025 COMPLETE CBC W/AUTO DIFF WBC: CPT | Performed by: ORTHOPAEDIC SURGERY

## 2021-01-15 NOTE — PAT
Pt here for PAT visit.  Pre-op tests completed, chg soap given, and instructions reviewed.  Instructed clears until 2 hrs prior to arrival time, voiced understanding. Pt previously had Covid in December. He was instructed to bring a copy of his positive result the dos surgery. Covid swab canceled.

## 2021-01-15 NOTE — DISCHARGE INSTRUCTIONS
PRE-ADMISSION TESTING INSTRUCTIONS FOR ADULTS    Take these medications the morning of surgery with a small sip of water: atrovastatin      No aspirin, advil, aleve, ibuprofen, naproxen, diet pills, decongestants, or herbal/vitamins for a week prior to surgery.    General Instructions:    • Do not eat solid food after midnight the night before surgery.  No gum, mints, or hard candy after midnight the night before surgery.  • You may drink clear liquids the day of surgery up until 2 hours before your arrival time.  • Clear liquids are liquids you can see through. Nothing RED in color.    Plain water    Sports drinks  Sodas     Gelatin (Jell-O)  Fruit juices without pulp such as white grape juice and apple juice  Popsicles that contain no fruit or yogurt  Tea or coffee (no cream or milk added)    • It is beneficial for you to have a clear drink that contains carbohydrates just before you leave your house and before your fasting time begins.  We suggest a 20 ounce bottle of Gatorade or Powerade for non-diabetic patients or a 20 ounce bottle of G2 or Powerade Zero for diabetic patients.     • Patients who avoid smoking, chewing tobacco and alcohol for 4 weeks prior to surgery have a reduced risk of post-operative complications.  If at all possible, quit smoking as many days before surgery as you can.    • Do not smoke, use chewing tobacco or drink alcohol the day of surgery    • Bring your C-PAP/ BI-PAP machine if you use one.  • Wear clean comfortable clothes and socks.  • Do not wear contact lenses, lotion, deodorant, or make-up.  Bring a case for your glasses if applicable. You may brush your teeth the morning of surgery.  • You may wear dentures/partials, do not put adhesive/glue on them.  • Bring crutches or walker if applicable.  • Leave all other jewelry and valuables at home.      Preventing a Surgical Site Infection:    • Shower the night before and on the morning of surgery using the chlorhexidine soap you  were given.  Use a clean washcloth with the soap.  Place clean sheets on your bed after showering the night before surgery. Do not use the CHG soap on your hair, face, or private areas. Wash your body gently for five (5) minutes. Do not scrub your skin.  Dry with a clean towel and dress in clean clothing.    • Do not shave the surgical area for 10 days-2 weeks prior to surgery  because the razor can irritate skin and make it easier to develop an infection.  • Make sure you, your family, and all healthcare providers clean their hands with soap and water or an alcohol based hand  before caring for you or your wound.      Day of surgery:    Your surgeon’s office will advise you of your arrival time for the day of surgery.    Upon arrival, a Pre-op nurse and Anesthesia provider will review your health history, obtain vital signs, and answer questions you may have.  The only belongings needed at this time will be your home medications and if applicable your C-PAP/BI-PAP machine.  If you are staying overnight your family can leave the rest of your belongings in the car and bring them to your room later.  A Pre-op nurse will start an IV and you may receive medication in preparation for surgery, including something to help you relax.  Your family will be able to see you in the Pre-op area.  While you are in surgery your family should notify the waiting room  if they leave the waiting room area and provide a contact phone number.    IF you have any questions, you can call the Pre-Admission Department at [phone] or your surgeon's office.  Notify your surgeon if  you become sick, have a fever, productive cough, or cannot be here the day of surgery    Please be aware that surgery does come with discomfort.  We want to make every effort to control your discomfort so please discuss any uncontrolled symptoms with your nurse.   Your doctor will most likely have prescribed pain medications.      If you  are going home after surgery, you will receive individualized written care instructions before being discharged.  A responsible adult (over the age of 18) must drive you to and from the hospital on the day of your surgery and stay with you for 24 hours after anesthesia.    If you are staying overnight following surgery, you will be transported to your hospital room following the recovery period.  [facility] has all private rooms.    You may receive a survey regarding the care you received. Your feedback is very important and will be used to collect the necessary data to help us to continue to provide excellent care.     Deductibles and co-payments are collected on the day of service. Please be prepared to pay the required co-pay, deductible or deposit on the day of service as defined by your plan.

## 2021-01-18 ENCOUNTER — APPOINTMENT (OUTPATIENT)
Dept: LAB | Facility: HOSPITAL | Age: 46
End: 2021-01-18

## 2021-01-19 ENCOUNTER — ANESTHESIA EVENT (OUTPATIENT)
Dept: PERIOP | Facility: HOSPITAL | Age: 46
End: 2021-01-19

## 2021-01-19 NOTE — H&P
History & Physical       Patient: Jose Barber    YOB: 1975    Medical Record Number: [id number]    Surgeon:  Dr. Pete Zuñiga    Chief Complaints: Left shoulder pain, rotator cuff tear      History of Present Illness: 45 y.o. male presents with left shoulder pain rotator cuff tear. Onset of symptoms was greater than 6 months ago and has been progressively worsening despite more conservative treatment measures.  Symptoms are associated with ability to move, lift, push, pull, and perform activities of daily living with affected extremity. Symptoms are aggravated by overhead motion, lifting, and pushing as well as ROM necessary for activities of daily living.   Symptoms improve with rest, ice and elevation only minimally.      Allergies:   Allergies   Allergen Reactions   • Crestor [Rosuvastatin Calcium] Dizziness     Dizziness and sluggish       Medications:   Home Medications:  No current facility-administered medications on file prior to encounter.      Current Outpatient Medications on File Prior to Encounter   Medication Sig   • aspirin 81 MG EC tablet Take 81 mg by mouth Daily.   • atorvastatin (LIPITOR) 10 MG tablet Take 1 tablet by mouth Daily.   • clobetasol (TEMOVATE) 0.05 % cream Apply  topically to the appropriate area as directed 2 (Two) Times a Day.   • desonide (DESOWEN) 0.05 % cream Apply  topically to the appropriate area as directed 2 (Two) Times a Day.   • Multiple Vitamin (MULTI VITAMIN MENS PO) Take  by mouth.   • pimecrolimus (ELIDEL) 1 % cream    • TURMERIC PO Take  by mouth Daily.     Current Medications:  Scheduled Meds:  Continuous Infusions:No current facility-administered medications for this encounter.     PRN Meds:.    I have reviewed the patient's medical history in detail and updated the computerized patient record.  Review and summarization of old records include:    Past Medical History:   Diagnosis Date   • TIA (transient ischemic attack)     3 years ago         Past Surgical History:   Procedure Laterality Date   • BASAL CELL CARCINOMA EXCISION     • KNEE ARTHROSCOPY W/ ACL RECONSTRUCTION          Social History     Occupational History   • Not on file   Tobacco Use   • Smoking status: Never Smoker   • Smokeless tobacco: Never Used   Substance and Sexual Activity   • Alcohol use: Yes     Alcohol/week: 1.0 standard drinks     Types: 1 Cans of beer per week     Comment: occasional   • Drug use: Never   • Sexual activity: Yes      Social History     Social History Narrative   • Not on file        Family History   Problem Relation Age of Onset   • Melanoma Maternal Grandmother    • Cancer Maternal Grandmother    • Heart attack Maternal Grandfather    • Heart attack Paternal Grandfather    • Cancer Paternal Grandfather    • Migraines Mother        ROS: 14 point review of systems was performed and was negative except for documented findings in HPI and today's encounter.     Allergies:   Allergies   Allergen Reactions   • Crestor [Rosuvastatin Calcium] Dizziness     Dizziness and sluggish     Constitutional:  Denies fever, shaking or chills   Eyes:  Denies change in visual acuity   HENT:  Denies nasal congestion or sore throat   Respiratory:  Denies cough or shortness of breath   Cardiovascular:  Denies chest pain or severe LE edema   GI:  Denies abdominal pain, nausea, vomiting, bloody stools or diarrhea   Musculoskeletal:  Denies numbness tingling or loss of motor function except as outlined above in history of present illness.  : Denies painful urination or hematuria  Integument:  Denies rash, lesion or ulceration   Neurologic:  Denies headache or focal weakness  Endocrine:  Denies lymphadenopathy  Psych:  Denies confusion or change in mental status   Hem:  Denies active bleeding    Physical Exam: 45 y.o. male  There is no height or weight on file to calculate BMI.    Vitals:    01/20/21 0937 01/20/21 0949   BP:  133/96   Pulse:  70   Resp:  18   Temp: 98.1 °F (36.7 °C)     TempSrc: Oral    SpO2:  97%   Weight: 85.6 kg (188 lb 12.8 oz)        Vital signs reviewed.   General Appearance:    Alert, cooperative, in no acute distress                  Eyes: conjunctiva clear  ENT: external ears and nose atraumatic  CV: no peripheral edema  Resp: normal respiratory effort  Skin: no rashes or wounds; normal turgor  Psych: mood and affect appropriate  Lymph: no nodes appreciated  Neuro: gross sensation intact  Vascular:  Palpable peripheral pulse in noted extremity  Musculoskeletal Extremities: Left shoulder-  Tenderness  located anterior, anterolateral  FF-       Active- 175               Strength- 4+/5  ER-      Active- 75              Strength- 4/5  IR        Strength- 5/5 on belly press test  Bear hug sign-negative  Empty can test- negative     Neer's sign- negative  Johns- positive     Cross arm test- negative  Tenderness over AC joint- negative     O'briens- negative     Brisk cap refill to all digits, palpable radial pulse     Positive sensation to light touch palmar, dorsal aspects of small and index fingers and anatomic snuffbox left hand    Debilities/Disabilities Identified: None      Diagnostic Tests:  No visits with results within 2 Week(s) from this visit.   Latest known visit with results is:   Results Encounter on 12/20/2020   Component Date Value Ref Range Status   • Glucose 01/15/2021 126* 65 - 99 mg/dL Final   • BUN 01/15/2021 11  6 - 20 mg/dL Final   • Creatinine 01/15/2021 1.20  0.76 - 1.27 mg/dL Final   • Sodium 01/15/2021 140  136 - 145 mmol/L Final   • Potassium 01/15/2021 4.3  3.5 - 5.2 mmol/L Final   • Chloride 01/15/2021 103  98 - 107 mmol/L Final   • CO2 01/15/2021 27.5  22.0 - 29.0 mmol/L Final   • Calcium 01/15/2021 9.0  8.6 - 10.5 mg/dL Final   • eGFR Non  Amer 01/15/2021 65  >60 mL/min/1.73 Final   • BUN/Creatinine Ratio 01/15/2021 9.2  7.0 - 25.0 Final   • Anion Gap 01/15/2021 9.5  5.0 - 15.0 mmol/L Final   • Protime 01/15/2021 14.1  12.1 - 15.0  Seconds Final   • INR 01/15/2021 1.12* 0.90 - 1.10 Final   • PTT 01/15/2021 30.5  24.3 - 38.1 seconds Final   • WBC 01/15/2021 4.21  3.40 - 10.80 10*3/mm3 Final   • RBC 01/15/2021 4.41  4.14 - 5.80 10*6/mm3 Final   • Hemoglobin 01/15/2021 14.5  13.0 - 17.7 g/dL Final   • Hematocrit 01/15/2021 42.7  37.5 - 51.0 % Final   • MCV 01/15/2021 96.8  79.0 - 97.0 fL Final   • MCH 01/15/2021 32.9  26.6 - 33.0 pg Final   • MCHC 01/15/2021 34.0  31.5 - 35.7 g/dL Final   • RDW 01/15/2021 12.9  12.3 - 15.4 % Final   • RDW-SD 01/15/2021 45.8  37.0 - 54.0 fl Final   • MPV 01/15/2021 8.8  6.0 - 12.0 fL Final   • Platelets 01/15/2021 171  140 - 450 10*3/mm3 Final   • Neutrophil % 01/15/2021 44.4  42.7 - 76.0 % Final   • Lymphocyte % 01/15/2021 46.1* 19.6 - 45.3 % Final   • Monocyte % 01/15/2021 7.6  5.0 - 12.0 % Final   • Eosinophil % 01/15/2021 1.4  0.3 - 6.2 % Final   • Basophil % 01/15/2021 0.5  0.0 - 1.5 % Final   • Immature Grans % 01/15/2021 0.0  0.0 - 0.5 % Final   • Neutrophils, Absolute 01/15/2021 1.87  1.70 - 7.00 10*3/mm3 Final   • Lymphocytes, Absolute 01/15/2021 1.94  0.70 - 3.10 10*3/mm3 Final   • Monocytes, Absolute 01/15/2021 0.32  0.10 - 0.90 10*3/mm3 Final   • Eosinophils, Absolute 01/15/2021 0.06  0.00 - 0.40 10*3/mm3 Final   • Basophils, Absolute 01/15/2021 0.02  0.00 - 0.20 10*3/mm3 Final   • Immature Grans, Absolute 01/15/2021 0.00  0.00 - 0.05 10*3/mm3 Final     No results found.    Imaging:  Mri Shoulder Left Without Contrast     Result Date: 11/27/2020  Impression: 1. Full-thickness supraspinatus tendon tear just proximal to its insertion with mild myotendinous retraction. 2. Type II acromial arch with acromial spur. Mild AC joint arthritis.  This report was finalized on 11/27/2020 8:05 AM by Dr. Gabino Lezama M.D.       Review of outside MRI left shoulder including review of image as well as radiology report indicates mild AC joint arthropathy with full-thickness rotator cuff tear of the supraspinatus  with minimal retraction to the lateral third of the humeral head, no significant muscular atrophy appreciated.     Review of x-rays from sports medicine office of left shoulder indicate no evidence of fracture, dislocation, or subluxation, mild AC joint arthropathy appreciated.    Assessment:  Patient Active Problem List   Diagnosis   • S/P ACL repair   • TIA due to embolism (CMS/HCC)   • Vertebral artery dissection (CMS/HCC)   • Mixed hyperlipidemia   • Complete tear of left rotator cuff   • Biceps tendinitis of left upper extremity   • Subacromial impingement of left shoulder       Plan:   Patient wishes to proceed with left shoulder arthroscopy, rotator cuff debridement versus repair with possible grafting, possible biceps tenodesis, subacromial decompression, and all associated procedures.  I reviewed risks benefits and alternatives the procedure with risks including not limited to neurovascular damage, bleeding, infection, chronic pain, re-tear rotator cuff, failure of healing rotator cuff, loss of motion, weakness, stiffness, instability, biceps sag, DVT, pulmonary embolus, death, stroke, complex regional pain syndrome, myocardial infarction, need for additional procedures. He understood all these had all questions answered.  Patient consented to proceed with surgery.  No guarantees were given regarding results of surgery.     Jose Barber was given the opportunity to ask and have all questions answered today.  The patient voiced understanding of the risks, benefits, and alternative forms of treatment that were discussed and the patient consents to proceed with surgery.     Discharge Plan: to home in  Care of family/friend    Pete Zuñiga MD      Dragon transcription disclaimer     Much of this encounter note is an electronic transcription/translation of spoken language to printed text. The electronic translation of spoken language may permit erroneous, or at times, nonsensical words or phrases to be  inadvertently transcribed. Although I have reviewed the note for such errors, some may still exist.

## 2021-01-20 ENCOUNTER — HOSPITAL ENCOUNTER (OUTPATIENT)
Facility: HOSPITAL | Age: 46
Setting detail: HOSPITAL OUTPATIENT SURGERY
Discharge: HOME | End: 2021-01-20
Attending: ORTHOPAEDIC SURGERY | Admitting: ORTHOPAEDIC SURGERY

## 2021-01-20 ENCOUNTER — ANESTHESIA (OUTPATIENT)
Dept: PERIOP | Facility: HOSPITAL | Age: 46
End: 2021-01-20

## 2021-01-20 VITALS
TEMPERATURE: 97.7 F | BODY MASS INDEX: 27.86 KG/M2 | DIASTOLIC BLOOD PRESSURE: 68 MMHG | OXYGEN SATURATION: 93 % | RESPIRATION RATE: 18 BRPM | HEART RATE: 98 BPM | WEIGHT: 188.8 LBS | SYSTOLIC BLOOD PRESSURE: 125 MMHG

## 2021-01-20 DIAGNOSIS — M75.42 SUBACROMIAL IMPINGEMENT OF LEFT SHOULDER: ICD-10-CM

## 2021-01-20 DIAGNOSIS — M75.122 COMPLETE TEAR OF LEFT ROTATOR CUFF, UNSPECIFIED WHETHER TRAUMATIC: ICD-10-CM

## 2021-01-20 DIAGNOSIS — M75.22 BICEPS TENDINITIS OF LEFT UPPER EXTREMITY: ICD-10-CM

## 2021-01-20 PROCEDURE — 29827 SHO ARTHRS SRG RT8TR CUF RPR: CPT | Performed by: ORTHOPAEDIC SURGERY

## 2021-01-20 PROCEDURE — C1713 ANCHOR/SCREW BN/BN,TIS/BN: HCPCS | Performed by: ORTHOPAEDIC SURGERY

## 2021-01-20 PROCEDURE — 25010000002 MIDAZOLAM PER 1MG: Performed by: ANESTHESIOLOGY

## 2021-01-20 PROCEDURE — 25010000003 CEFAZOLIN SODIUM-DEXTROSE 2-3 GM-%(50ML) RECONSTITUTED SOLUTION: Performed by: ORTHOPAEDIC SURGERY

## 2021-01-20 PROCEDURE — C1763 CONN TISS, NON-HUMAN: HCPCS | Performed by: ORTHOPAEDIC SURGERY

## 2021-01-20 PROCEDURE — 23430 REPAIR BICEPS TENDON: CPT | Performed by: SPECIALIST/TECHNOLOGIST, OTHER

## 2021-01-20 PROCEDURE — 76942 ECHO GUIDE FOR BIOPSY: CPT | Performed by: ORTHOPAEDIC SURGERY

## 2021-01-20 PROCEDURE — 29823 SHO ARTHRS SRG XTNSV DBRDMT: CPT | Performed by: ORTHOPAEDIC SURGERY

## 2021-01-20 PROCEDURE — 29823 SHO ARTHRS SRG XTNSV DBRDMT: CPT | Performed by: SPECIALIST/TECHNOLOGIST, OTHER

## 2021-01-20 PROCEDURE — 25010000002 EPINEPHRINE PER 0.1 MG: Performed by: ORTHOPAEDIC SURGERY

## 2021-01-20 PROCEDURE — 25010000002 NEOSTIGMINE 10 MG/10ML SOLUTION: Performed by: NURSE ANESTHETIST, CERTIFIED REGISTERED

## 2021-01-20 PROCEDURE — C1889 IMPLANT/INSERT DEVICE, NOC: HCPCS | Performed by: ORTHOPAEDIC SURGERY

## 2021-01-20 PROCEDURE — 23430 REPAIR BICEPS TENDON: CPT | Performed by: ORTHOPAEDIC SURGERY

## 2021-01-20 PROCEDURE — 25010000002 FENTANYL CITRATE (PF) 100 MCG/2ML SOLUTION: Performed by: ANESTHESIOLOGY

## 2021-01-20 PROCEDURE — 25010000002 ONDANSETRON PER 1 MG: Performed by: ANESTHESIOLOGY

## 2021-01-20 PROCEDURE — 25010000002 DEXAMETHASONE PER 1 MG: Performed by: ANESTHESIOLOGY

## 2021-01-20 PROCEDURE — 25010000002 PROPOFOL 10 MG/ML EMULSION: Performed by: NURSE ANESTHETIST, CERTIFIED REGISTERED

## 2021-01-20 PROCEDURE — 29827 SHO ARTHRS SRG RT8TR CUF RPR: CPT | Performed by: SPECIALIST/TECHNOLOGIST, OTHER

## 2021-01-20 DEVICE — HEALICOIL PK 5.5 MM SUTURE ANCHOR                                    WITH THREE ULTRABRAID NO.2 SUTURES                                    BLUE, BLUE-COBRAID, COBRAID-BLACK STERILE
Type: IMPLANTABLE DEVICE | Site: SHOULDER | Status: FUNCTIONAL
Brand: HEALICOIL

## 2021-01-20 DEVICE — HEALICOIL KNOTLESS PK  NST
Type: IMPLANTABLE DEVICE | Site: SHOULDER | Status: FUNCTIONAL
Brand: HEALICOIL

## 2021-01-20 DEVICE — IMPLANTABLE DEVICE
Type: IMPLANTABLE DEVICE | Site: SHOULDER | Status: FUNCTIONAL
Brand: BIOINDUCTIVE IMPLANT WITH ARTHROSCOPIC DELIVERY SYSTEM - MEDIUM

## 2021-01-20 DEVICE — 2.8MM Q-FIX ALL SUTURE ANCHOR
Type: IMPLANTABLE DEVICE | Site: SHOULDER | Status: FUNCTIONAL
Brand: Q-FIX

## 2021-01-20 DEVICE — ANCHR TNDN REGENETEN TISS/SFT EA/8: Type: IMPLANTABLE DEVICE | Site: SHOULDER | Status: FUNCTIONAL

## 2021-01-20 DEVICE — BONE ANCHORS 3 WITH ARTHROSCOPIC DELIVERY SYSTEM ADVANCED
Type: IMPLANTABLE DEVICE | Site: SHOULDER | Status: FUNCTIONAL
Brand: BONE ANCHORS WITH ARTHROSCOPIC DELIVERY SYSTEM - ADVANCED

## 2021-01-20 RX ORDER — FENTANYL CITRATE 50 UG/ML
INJECTION, SOLUTION INTRAMUSCULAR; INTRAVENOUS AS NEEDED
Status: DISCONTINUED | OUTPATIENT
Start: 2021-01-20 | End: 2021-01-20 | Stop reason: SURG

## 2021-01-20 RX ORDER — FAMOTIDINE 10 MG/ML
20 INJECTION, SOLUTION INTRAVENOUS
Status: COMPLETED | OUTPATIENT
Start: 2021-01-20 | End: 2021-01-20

## 2021-01-20 RX ORDER — ONDANSETRON 2 MG/ML
4 INJECTION INTRAMUSCULAR; INTRAVENOUS ONCE AS NEEDED
Status: COMPLETED | OUTPATIENT
Start: 2021-01-20 | End: 2021-01-20

## 2021-01-20 RX ORDER — SODIUM CHLORIDE 9 MG/ML
40 INJECTION, SOLUTION INTRAVENOUS AS NEEDED
Status: DISCONTINUED | OUTPATIENT
Start: 2021-01-20 | End: 2021-01-20 | Stop reason: HOSPADM

## 2021-01-20 RX ORDER — EPHEDRINE SULFATE 50 MG/ML
INJECTION, SOLUTION INTRAVENOUS AS NEEDED
Status: DISCONTINUED | OUTPATIENT
Start: 2021-01-20 | End: 2021-01-20 | Stop reason: SURG

## 2021-01-20 RX ORDER — OXYCODONE HYDROCHLORIDE AND ACETAMINOPHEN 5; 325 MG/1; MG/1
1 TABLET ORAL EVERY 4 HOURS PRN
Qty: 42 TABLET | Refills: 0 | Status: SHIPPED | OUTPATIENT
Start: 2021-01-20

## 2021-01-20 RX ORDER — LIDOCAINE HYDROCHLORIDE 10 MG/ML
0.5 INJECTION, SOLUTION EPIDURAL; INFILTRATION; INTRACAUDAL; PERINEURAL ONCE AS NEEDED
Status: DISCONTINUED | OUTPATIENT
Start: 2021-01-20 | End: 2021-01-20 | Stop reason: HOSPADM

## 2021-01-20 RX ORDER — GLYCOPYRROLATE 0.2 MG/ML
INJECTION INTRAMUSCULAR; INTRAVENOUS AS NEEDED
Status: DISCONTINUED | OUTPATIENT
Start: 2021-01-20 | End: 2021-01-20 | Stop reason: SURG

## 2021-01-20 RX ORDER — ONDANSETRON 2 MG/ML
4 INJECTION INTRAMUSCULAR; INTRAVENOUS ONCE AS NEEDED
Status: DISCONTINUED | OUTPATIENT
Start: 2021-01-20 | End: 2021-01-20 | Stop reason: HOSPADM

## 2021-01-20 RX ORDER — CEFAZOLIN SODIUM 2 G/50ML
2 SOLUTION INTRAVENOUS ONCE
Status: COMPLETED | OUTPATIENT
Start: 2021-01-20 | End: 2021-01-20

## 2021-01-20 RX ORDER — NEOSTIGMINE METHYLSULFATE 1 MG/ML
INJECTION, SOLUTION INTRAVENOUS AS NEEDED
Status: DISCONTINUED | OUTPATIENT
Start: 2021-01-20 | End: 2021-01-20 | Stop reason: SURG

## 2021-01-20 RX ORDER — HYDROMORPHONE HYDROCHLORIDE 1 MG/ML
0.5 INJECTION, SOLUTION INTRAMUSCULAR; INTRAVENOUS; SUBCUTANEOUS
Status: DISCONTINUED | OUTPATIENT
Start: 2021-01-20 | End: 2021-01-20 | Stop reason: HOSPADM

## 2021-01-20 RX ORDER — ACETAMINOPHEN 325 MG/1
650 TABLET ORAL ONCE AS NEEDED
Status: COMPLETED | OUTPATIENT
Start: 2021-01-20 | End: 2021-01-20

## 2021-01-20 RX ORDER — SENNA PLUS 8.6 MG/1
1 TABLET ORAL NIGHTLY
Qty: 20 TABLET | Refills: 0 | Status: SHIPPED | OUTPATIENT
Start: 2021-01-20

## 2021-01-20 RX ORDER — SODIUM CHLORIDE 0.9 % (FLUSH) 0.9 %
10 SYRINGE (ML) INJECTION AS NEEDED
Status: DISCONTINUED | OUTPATIENT
Start: 2021-01-20 | End: 2021-01-20 | Stop reason: HOSPADM

## 2021-01-20 RX ORDER — MIDAZOLAM HYDROCHLORIDE 2 MG/2ML
1 INJECTION, SOLUTION INTRAMUSCULAR; INTRAVENOUS
Status: DISCONTINUED | OUTPATIENT
Start: 2021-01-20 | End: 2021-01-20 | Stop reason: HOSPADM

## 2021-01-20 RX ORDER — ACETAMINOPHEN 500 MG
1000 TABLET ORAL ONCE
Status: COMPLETED | OUTPATIENT
Start: 2021-01-20 | End: 2021-01-20

## 2021-01-20 RX ORDER — MEPERIDINE HYDROCHLORIDE 25 MG/ML
12.5 INJECTION INTRAMUSCULAR; INTRAVENOUS; SUBCUTANEOUS
Status: DISCONTINUED | OUTPATIENT
Start: 2021-01-20 | End: 2021-01-20 | Stop reason: HOSPADM

## 2021-01-20 RX ORDER — BUPIVACAINE HYDROCHLORIDE 5 MG/ML
INJECTION, SOLUTION EPIDURAL; INTRACAUDAL
Status: COMPLETED | OUTPATIENT
Start: 2021-01-20 | End: 2021-01-20

## 2021-01-20 RX ORDER — ROCURONIUM BROMIDE 10 MG/ML
INJECTION, SOLUTION INTRAVENOUS AS NEEDED
Status: DISCONTINUED | OUTPATIENT
Start: 2021-01-20 | End: 2021-01-20 | Stop reason: SURG

## 2021-01-20 RX ORDER — KETAMINE HYDROCHLORIDE 10 MG/ML
INJECTION INTRAMUSCULAR; INTRAVENOUS AS NEEDED
Status: DISCONTINUED | OUTPATIENT
Start: 2021-01-20 | End: 2021-01-20 | Stop reason: SURG

## 2021-01-20 RX ORDER — HYDROMORPHONE HYDROCHLORIDE 1 MG/ML
1 INJECTION, SOLUTION INTRAMUSCULAR; INTRAVENOUS; SUBCUTANEOUS
Status: DISCONTINUED | OUTPATIENT
Start: 2021-01-20 | End: 2021-01-20 | Stop reason: HOSPADM

## 2021-01-20 RX ORDER — MELOXICAM 7.5 MG/1
15 TABLET ORAL ONCE
Status: COMPLETED | OUTPATIENT
Start: 2021-01-20 | End: 2021-01-20

## 2021-01-20 RX ORDER — ACETAMINOPHEN 650 MG/1
650 SUPPOSITORY RECTAL ONCE AS NEEDED
Status: COMPLETED | OUTPATIENT
Start: 2021-01-20 | End: 2021-01-20

## 2021-01-20 RX ORDER — DEXAMETHASONE SODIUM PHOSPHATE 4 MG/ML
8 INJECTION, SOLUTION INTRA-ARTICULAR; INTRALESIONAL; INTRAMUSCULAR; INTRAVENOUS; SOFT TISSUE ONCE
Status: COMPLETED | OUTPATIENT
Start: 2021-01-20 | End: 2021-01-20

## 2021-01-20 RX ORDER — SODIUM CHLORIDE 9 MG/ML
INJECTION, SOLUTION INTRAVENOUS AS NEEDED
Status: DISCONTINUED | OUTPATIENT
Start: 2021-01-20 | End: 2021-01-20 | Stop reason: HOSPADM

## 2021-01-20 RX ORDER — PREGABALIN 75 MG/1
150 CAPSULE ORAL ONCE
Status: COMPLETED | OUTPATIENT
Start: 2021-01-20 | End: 2021-01-20

## 2021-01-20 RX ORDER — LIDOCAINE HYDROCHLORIDE AND EPINEPHRINE 15; 5 MG/ML; UG/ML
INJECTION, SOLUTION EPIDURAL AS NEEDED
Status: DISCONTINUED | OUTPATIENT
Start: 2021-01-20 | End: 2021-01-20 | Stop reason: HOSPADM

## 2021-01-20 RX ORDER — SODIUM CHLORIDE 0.9 % (FLUSH) 0.9 %
10 SYRINGE (ML) INJECTION EVERY 12 HOURS SCHEDULED
Status: DISCONTINUED | OUTPATIENT
Start: 2021-01-20 | End: 2021-01-20 | Stop reason: HOSPADM

## 2021-01-20 RX ORDER — MAGNESIUM HYDROXIDE 1200 MG/15ML
LIQUID ORAL AS NEEDED
Status: DISCONTINUED | OUTPATIENT
Start: 2021-01-20 | End: 2021-01-20 | Stop reason: HOSPADM

## 2021-01-20 RX ORDER — PROPOFOL 10 MG/ML
VIAL (ML) INTRAVENOUS AS NEEDED
Status: DISCONTINUED | OUTPATIENT
Start: 2021-01-20 | End: 2021-01-20 | Stop reason: SURG

## 2021-01-20 RX ORDER — ONDANSETRON 4 MG/1
4 TABLET, FILM COATED ORAL EVERY 8 HOURS PRN
Qty: 30 TABLET | Refills: 0 | Status: SHIPPED | OUTPATIENT
Start: 2021-01-20

## 2021-01-20 RX ORDER — OXYCODONE AND ACETAMINOPHEN 7.5; 325 MG/1; MG/1
1 TABLET ORAL ONCE AS NEEDED
Status: DISCONTINUED | OUTPATIENT
Start: 2021-01-20 | End: 2021-01-20 | Stop reason: HOSPADM

## 2021-01-20 RX ORDER — SODIUM CHLORIDE, SODIUM LACTATE, POTASSIUM CHLORIDE, CALCIUM CHLORIDE 600; 310; 30; 20 MG/100ML; MG/100ML; MG/100ML; MG/100ML
9 INJECTION, SOLUTION INTRAVENOUS CONTINUOUS PRN
Status: DISCONTINUED | OUTPATIENT
Start: 2021-01-20 | End: 2021-01-20 | Stop reason: HOSPADM

## 2021-01-20 RX ORDER — DIPHENHYDRAMINE HYDROCHLORIDE 50 MG/ML
12.5 INJECTION INTRAMUSCULAR; INTRAVENOUS
Status: DISCONTINUED | OUTPATIENT
Start: 2021-01-20 | End: 2021-01-20 | Stop reason: HOSPADM

## 2021-01-20 RX ADMIN — EPHEDRINE SULFATE 10 MG: 50 INJECTION, SOLUTION INTRAVENOUS at 13:46

## 2021-01-20 RX ADMIN — GLYCOPYRROLATE 0.4 MG: 0.2 INJECTION INTRAMUSCULAR; INTRAVENOUS at 14:03

## 2021-01-20 RX ADMIN — FENTANYL CITRATE 50 MCG: 50 INJECTION, SOLUTION INTRAMUSCULAR; INTRAVENOUS at 11:48

## 2021-01-20 RX ADMIN — FENTANYL CITRATE 25 MCG: 50 INJECTION, SOLUTION INTRAMUSCULAR; INTRAVENOUS at 11:25

## 2021-01-20 RX ADMIN — FENTANYL CITRATE 25 MCG: 50 INJECTION, SOLUTION INTRAMUSCULAR; INTRAVENOUS at 11:22

## 2021-01-20 RX ADMIN — BUPIVACAINE HYDROCHLORIDE 20 ML: 5 INJECTION, SOLUTION EPIDURAL; INTRACAUDAL; PERINEURAL at 11:27

## 2021-01-20 RX ADMIN — DEXAMETHASONE SODIUM PHOSPHATE 8 MG: 4 INJECTION, SOLUTION INTRAMUSCULAR; INTRAVENOUS at 10:55

## 2021-01-20 RX ADMIN — PROPOFOL 200 MG: 10 INJECTION, EMULSION INTRAVENOUS at 11:48

## 2021-01-20 RX ADMIN — ACETAMINOPHEN 1000 MG: 500 TABLET ORAL at 10:05

## 2021-01-20 RX ADMIN — SODIUM CHLORIDE, POTASSIUM CHLORIDE, SODIUM LACTATE AND CALCIUM CHLORIDE: 600; 310; 30; 20 INJECTION, SOLUTION INTRAVENOUS at 11:43

## 2021-01-20 RX ADMIN — MIDAZOLAM HYDROCHLORIDE 1 MG: 1 INJECTION, SOLUTION INTRAMUSCULAR; INTRAVENOUS at 11:17

## 2021-01-20 RX ADMIN — CEFAZOLIN SODIUM 2 G: 2 SOLUTION INTRAVENOUS at 11:48

## 2021-01-20 RX ADMIN — PREGABALIN 150 MG: 75 CAPSULE ORAL at 10:05

## 2021-01-20 RX ADMIN — GLYCOPYRROLATE 0.2 MG: 0.2 INJECTION INTRAMUSCULAR; INTRAVENOUS at 13:22

## 2021-01-20 RX ADMIN — FAMOTIDINE 20 MG: 10 INJECTION INTRAVENOUS at 10:55

## 2021-01-20 RX ADMIN — EPHEDRINE SULFATE 10 MG: 50 INJECTION, SOLUTION INTRAVENOUS at 13:24

## 2021-01-20 RX ADMIN — ROCURONIUM BROMIDE 35 MG: 10 INJECTION, SOLUTION INTRAVENOUS at 11:49

## 2021-01-20 RX ADMIN — ROCURONIUM BROMIDE 15 MG: 10 INJECTION, SOLUTION INTRAVENOUS at 11:51

## 2021-01-20 RX ADMIN — KETAMINE HYDROCHLORIDE 25 MG: 10 INJECTION, SOLUTION INTRAMUSCULAR; INTRAVENOUS at 12:10

## 2021-01-20 RX ADMIN — ONDANSETRON 4 MG: 2 INJECTION, SOLUTION INTRAMUSCULAR; INTRAVENOUS at 10:55

## 2021-01-20 RX ADMIN — ACETAMINOPHEN 650 MG: 325 TABLET, FILM COATED ORAL at 14:40

## 2021-01-20 RX ADMIN — NEOSTIGMINE METHYLSULFATE 3 MG: 1 INJECTION INTRAVENOUS at 14:02

## 2021-01-20 RX ADMIN — MELOXICAM 15 MG: 7.5 TABLET ORAL at 10:05

## 2021-01-20 NOTE — ANESTHESIA POSTPROCEDURE EVALUATION
Patient: Jose Barber    Procedure Summary     Date: 01/20/21 Room / Location:  [facility] OR 3 /  [facility]    Anesthesia Start: 1143 Anesthesia Stop: 1426    Procedure: SHOULDER ARTHROSCOPY, rotator cuff repair with bio-inductive implant, open biceps tenodesis, extensive debridement (Left Shoulder) Diagnosis:       Complete tear of left rotator cuff, unspecified whether traumatic      Biceps tendinitis of left upper extremity      Subacromial impingement of left shoulder      (Complete tear of left rotator cuff, unspecified whether traumatic [M75.122])      (Biceps tendinitis of left upper extremity [M75.22])      (Subacromial impingement of left shoulder [M75.42])    Surgeon: Pete Zuñiga MD Provider: Darryl Dubose CRNA    Anesthesia Type: general with block ASA Status: 2          Anesthesia Type: general with block    Vitals  Vitals Value Taken Time   /65 01/20/21 1501   Temp 97.7 °F (36.5 °C) 01/20/21 1429   Pulse 93 01/20/21 1504   Resp 15 01/20/21 1500   SpO2 93 % 01/20/21 1504   Vitals shown include unvalidated device data.        Post Anesthesia Care and Evaluation    Patient location during evaluation: PHASE II  Patient participation: complete - patient participated  Level of consciousness: awake and alert  Pain score: 0  Pain management: satisfactory to patient  Airway patency: patent  Anesthetic complications: No anesthetic complications  PONV Status: none  Cardiovascular status: acceptable  Respiratory status: acceptable  Hydration status: acceptable

## 2021-01-20 NOTE — ANESTHESIA PROCEDURE NOTES
Airway  Urgency: elective    Date/Time: 1/20/2021 11:52 AM    General Information and Staff    Patient location during procedure: OR    Indications and Patient Condition  Indications for airway management: airway protection    Preoxygenated: yes  MILS maintained throughout  Mask difficulty assessment: 1 - vent by mask    Final Airway Details  Final airway type: endotracheal airway      Successful airway: ETT    Successful intubation technique: direct laryngoscopy and video laryngoscopy (dvl x 1 Leahy 3 switched to video to optimize view)  Facilitating devices/methods: intubating stylet  Blade: Pulliam  Blade size: 3  ETT size (mm): 7.5  Cormack-Lehane Classification: grade I - full view of glottis  Placement verified by: chest auscultation and capnometry   Measured from: lips  ETT/EBT  to lips (cm): 22  Number of attempts at approach: 1  Assessment: lips, teeth, and gum same as pre-op and atraumatic intubation

## 2021-01-20 NOTE — OP NOTE
Date of Operation:  1/20/2021     PREOPERATIVE DIAGNOSIS:  1. Left shoulder rotator cuff tear  2. Left shoulder biceps tendinitis  3. Left shoulder subacromial bursitis    POSTOPERATIVE DIAGNOSIS:    1. Left shoulder rotator cuff tear  2. Left shoulder biceps tendinitis  3. Left shoulder subacromial bursitis    PROCEDURE PERFORMED:   1. Left shoulder arthroscopic rotator cuff repair with use of bio inductive implant  2. Left shoulder open biceps tenodesis  3. Left shoulder arthroscopy with extensive debridement including glenohumeral and subacromial space     SURGEON: Pete Zuñiga MD     ASSISTANT:  Hunter     ANESTHESIA: General endotracheal anesthesia with regional block.       ESTIMATED BLOOD LOSS:  minimal     URINE OUTPUT: Not recorded.       FLUIDS: Per anesthesia.       COMPLICATIONS: None.       SPECIMENS: None.       DRAINS: None.      IMPLANTS:  [employer]hew 2.8 mm Q fix anchor x1 for biceps tenodesis, [employer]hew 5.5 mm Healicoil anchor x1 and 5.0 mm lateral Healicoil anchor x1 for double row rotator cuff repair, medium [employer] bio inductive implant     ARTHROSCOPIC FINDINGS:   1.  Glenoid-no significant articular cartilage pathology  2.  Humeral head-no significant articular cartilage pathology  3.  Labrum-moderate degenerative fraying of anterior, superior, and posterior labrum, moderate synovitis in the rotator interval  4.  Biceps tendon-significant biceps tenosynovitis in the bicipital groove portion with moderate degenerative fraying of superior labrum at biceps anchor site  5.  Rotator cuff-medial full-thickness rotator cuff tear crescent-shaped involving supraspinatus with retraction to the central third of the humeral head  6.  Axillary pouch-free loose bodies  7.  Subacromial space-moderately thickened subacromial bursa, no significant degenerative change to AC joint     INDICATIONS FOR PROCEDURE: Patient is a pleasant 45 y.o. who has had significant limitation and use of left shoulder  as well as associated pain with failure of conservative treatments. I discussed treatment options available to the patient and patient wished to proceed with surgical treatment. I explained details of the procedure, as well as the risks, benefits, and alternatives as documented on history and physical, and the patient had all questions answered prior to signing the operative consent form. No guarantees were given in regard to results of the surgery.       DESCRIPTION OF PROCEDURE: The patient was seen, evaluated, and cleared for surgery by anesthesia. Admitted in the preoperative holding area. The operative site was marked, consent was reviewed, history and physical was updated, and preoperative labs were reviewed. A regional block was then placed per anesthesia. The patient was then taken to the operating room and placed in a supine position on a beachchair table. After successful intubation per anesthesia, facemask was placed securing head at this point time with the neck in normal anatomic position.  Patient was then elevated up into a seated position with neck maintained in normal anatomic alignment. All bony prominences were well-padded and patient was secured to the table with a waist strap. The left  upper extremity was then sterilely prepped and draped in a standard fashion.       A formal timeout was completed, including confirmation of History and Physical, operative consent, surgical site, patient identification number, and preoperative antibiotic administration.       Attention was then turned to creation of posterior portal with a 11 blade followed by insertion of blunt trocar and cannula.  Scope was inserted at this point time.  Anterior portal was then made in the rotator interval with spinal needle using outside in technique.  Cannula was then inserted over a trocar and diagnostic arthroscopic exam was carried out at this point in time with findings as noted above.     Attention was then turned to  debridement of glenohumeral joint space including debridement of the superior, anterior, posterior labral fraying as well as the moderate synovitis in the rotator interval with combination of hand-held shaver and ablation wand.       Attention was then turned to release of the biceps tendon. Arthroscopic scissor was inserted through the anterior portal and used to release the biceps tendon at the superior labrum. The superior labrum was then debrided to a smooth stable edge with no residual prominence noted with use of a hand-held shaver as well as ablation wand at this time.     Attention was then turned to open biceps tenodesis.  4 cm longitudinal incision was made centered over the inferior border of the pectoralis major through skin only with 15 blade at this point time.  Once subcutaneous dissection was carried out to the inferior border the pectoralis major was bluntly elevated proximal lateral and retracted at this time.  Biceps tendon was identified at this point time and retrieved with the right angle.  Q fix guide was placed in the bicipital groove of the proximalmost portion identified under direct visualization.  Drill was passed in unicortical fashion followed by insertion of Q fix anchor which was secured into position with good stability on tension across the suture strands.  Biceps tendon was then whipstitched with a running locking stitch, using one suture strand from each pair.  The second strand was passed in simple fashion to the biceps tendon.  The musculotendinous junction was reapproximated to the inferior border of the pectoralis major.  Excess biceps tendon was resected and tenodesis was then completed with the tendon being tied down with 6 alternating half hitches ensuring good loop and knot security of the repair site.  Sutures were cut short at this point time.  Wound was then thoroughly irrigated with normal saline.      Attention was then returned to the subacromial space where the  scope was inserted through the posterior portal, cannula inserted through the anterior portal and subacromial space was evaluated at this point in time.  Debridement of subacromial bursitis was completed with shaver as well as ablation wand at this point time.     Attention was then turned to debridement and repair of the rotator cuff tear.  Debridement was completed out of the rotator cuff tissue with ablation wand as well as shaver with additional debridement of the remaining lateral portion of the rotator cuff that was still attached to the greater tuberosity insertion site with use of a shaver.  Minimal decortication of the insertion site of the greater tuberosity was completed with shaver creating a bleeding bony bed to enhance biologic healing potential.  5.5 mm triple loaded Healicoil anchor was then tapped and inserted x1 at the articular margin.  Accu pass device was then used to pass sutures in horizontal mattress fashion through the full-thickness layer of the rotator cuff which was noted to have moderate tendinopathy.  Once all sutures were passed, they were tied down sequentially with sliding Leslie knot followed by 4 alternating half hitches ensuring good loop and knot security.  Suture strands were then brought to a lateral row anchor with  hole being made with tap followed by sutures passed through the eyelet of the anchor.  [place] was then impacted into position into the  hole with appropriate tension maintained on the sutures completing double row fixation.  Suture ends were cut short at this time.    Given moderate tendinopathy would like to proceed with use of bio inductive implant for augmentation of repair.  [employer] medium sized bio inductive implant was then inserted with insertion device from the lateral portal and laid over top of the residual defect site of the rotator cuff tear.  Soft tissue staples were placed anterior, central, and posterior along the medial margin as well as  along the anterior central margin to secure the graft.  Insertion device was removed at this point time and 4 additional soft tissue staples were used to secure the anterior and posterior margins of the graft to the underlying cuff tissue.  Graft was noted to be stable on arthroscopic exam with motion and acceptable position of the graft.    Fluid was evacuated with suction and arthroscopic instruments were removed at this point time.     Attention was then turned to closure the wounds with biceps tenodesis site being closed with 3-0 Vicryl for subcutaneous closure in inverted fashion followed by skin closure with 3-0 Monocryl and Steri-Strips.  Portal sites were closed with 3-0 nylon in interrupted fashion.  Wounds were dressed with Xeroform gauze, 4 x 4, Tegaderm, ABD pad, Medipore tape and patient was placed in a sling with abduction pillow to the left side.     At the end of the procedure, all lap, needle, and sponge counts were correct x2. The patient had brisk capillary refill to all digits of the left upper extremity. Compartments were soft and easily compressible at the end of the procedure.       DISPOSITION: The patient was extubated per anesthesia and taken to the recovery room in stable condition. Will follow up in office in 1 week for wound check. Results discussed immediately after procedure with family and all questions were answered at that time.       REHAB:  Will place patient on standard rotator cuff repair protocol, begin physical therapy a week 3, may discontinue sling at week 4.  Biceps tenodesis precautions.

## 2021-01-20 NOTE — ANESTHESIA PREPROCEDURE EVALUATION
Anesthesia Evaluation     Patient summary reviewed and Nursing notes reviewed   no history of anesthetic complications:  NPO Solid Status: N/A  NPO Liquid Status: > 2 hours           Airway   Mallampati: II  TM distance: >3 FB  Neck ROM: full  No difficulty expected  Dental - normal exam     Pulmonary - negative pulmonary ROS and normal exam    breath sounds clear to auscultation  Sleep apnea: snores   Cardiovascular - normal exam  Exercise tolerance: good (4-7 METS)    Rhythm: regular  Rate: normal    (+) hyperlipidemia,       Neuro/Psych  (+) TIA (2018-diagnosed with TIA and vertebral artery dissection.  Spontaneously resolved, no procedures required, no sequelae.  Seen by neuro with no further problems.),     Dizziness: only with covid in December.  GI/Hepatic/Renal/Endo - negative ROS     Musculoskeletal     Abdominal  - normal exam   Substance History   (-) drug useAlcohol use: occ.     OB/GYN          Other   arthritis (left knee and current left shoulder),      ROS/Med Hx Other: Water 0700                Anesthesia Plan    ASA 2     general with block     intravenous induction     Anesthetic plan, all risks, benefits, and alternatives have been provided, discussed and informed consent has been obtained with: patient and spouse/significant other.  Use of blood products discussed with patient and spouse/significant other  Consented to blood products.

## 2021-01-20 NOTE — BRIEF OP NOTE
SHOULDER ARTHROSCOPY WITH ROTATOR CUFF REPAIR  Progress Note    Jose Barber  1/20/2021    Pre-op Diagnosis:   Complete tear of left rotator cuff, unspecified whether traumatic [M75.122]  Biceps tendinitis of left upper extremity [M75.22]  Subacromial impingement of left shoulder [M75.42]       Post-Op Diagnosis Codes:     * Complete tear of left rotator cuff, unspecified whether traumatic [M75.122]     * Biceps tendinitis of left upper extremity [M75.22]     * Subacromial impingement of left shoulder [M75.42]    Procedure/CPT® Codes:        Procedure(s):  Left SHOULDER ARTHROSCOPY, rotator cuff repair with bio-inductive implant, open biceps tenodesis, extensive debridement    Surgeon(s):  Pete Zuñiga MD    Anesthesia: General with Block    Staff:   Circulator: Sandhya Perez RN; Corrie Machado RN  Scrub Person: Juliet Rousseau  Assistant: Moise Harrison CSA  Assistant: Moise Harrison CSA      Estimated Blood Loss: minimal    Urine Voided: * No values recorded between 1/20/2021 11:40 AM and 1/20/2021  2:10 PM *    Specimens:                None          Drains: * No LDAs found *    Findings: see op report    Complications: none    Assistant: Moise Harrison CSA  was responsible for performing the following activities: Retraction, Suction, Irrigation, Suturing, Closing and Held/Positioned Camera and their skilled assistance was necessary for the success of this case.    Pete Zuñiga MD     Date: 1/20/2021  Time: 14:10 EST

## 2021-01-20 NOTE — ANESTHESIA PROCEDURE NOTES
Peripheral Block    Pre-sedation assessment completed: 1/20/2021 11:20 AM    Patient reassessed immediately prior to procedure    Patient location during procedure: pre-op  Start time: 1/20/2021 11:22 AM  Stop time: 1/20/2021 11:28 AM  Reason for block: procedure for pain and secondary anesthetic  Performed by  Anesthesiologist: Afia Moreno MD  Preanesthetic Checklist  Completed: patient identified, site marked, surgical consent, pre-op evaluation, timeout performed, IV checked, risks and benefits discussed and monitors and equipment checked  Prep:  Pt Position: supine  Sterile barriers:cap, gloves, mask, partial drape and washed/disinfected hands  Prep: ChloraPrep  Patient monitoring: blood pressure monitoring, continuous pulse oximetry and EKG  Procedure  Sedation:yes  Performed under: local infiltration  Guidance:ultrasound guided and nerve stimulator  ULTRASOUND INTERPRETATION.  Using ultrasound guidance a 21 G gauge needle was placed in close proximity to the brachial plexus nerve, at which point, under ultrasound guidance anesthetic was injected in the area of the nerve and spread of the anesthesia was seen on ultrasound in close proximity thereto.  There were no abnormalities seen on ultrasound; a digital image was taken; and the patient tolerated the procedure with no complications. Images:still images obtained, printed/placed on chart  Loss of twitch: 0.5 mA  Laterality:left  Block Type:interscalene  Injection Technique:single-shot  Needle Type:echogenic  Needle Gauge:21 G      Medications Used: bupivacaine PF (MARCAINE) injection 0.5%, 20 mL  Med admintered at 1/20/2021 11:27 AM      Post Assessment  Injection Assessment: negative aspiration for heme, no paresthesia on injection and incremental injection  Patient Tolerance:comfortable throughout block  Complications:no

## 2021-01-25 RX ORDER — ATORVASTATIN CALCIUM 10 MG/1
TABLET, FILM COATED ORAL
Qty: 30 TABLET | Refills: 5 | Status: SHIPPED | OUTPATIENT
Start: 2021-01-25 | End: 2021-07-27

## 2021-01-25 NOTE — TELEPHONE ENCOUNTER
Pt last seen 5/6/2020. Follow up scheduled 5/12/2021.       Quantity 30 for 30 days.      Please review and approve or advise.     Thank you.

## 2021-01-26 ENCOUNTER — TELEPHONE (OUTPATIENT)
Dept: ORTHOPEDICS | Facility: OTHER | Age: 46
End: 2021-01-26

## 2021-01-26 DIAGNOSIS — M75.122 COMPLETE TEAR OF LEFT ROTATOR CUFF, UNSPECIFIED WHETHER TRAUMATIC: Primary | ICD-10-CM

## 2021-01-26 DIAGNOSIS — M79.2 NEUROPATHIC PAIN: ICD-10-CM

## 2021-01-26 RX ORDER — PREGABALIN 75 MG/1
75 CAPSULE ORAL 2 TIMES DAILY
Qty: 60 CAPSULE | Refills: 0 | Status: SHIPPED | OUTPATIENT
Start: 2021-01-26

## 2021-01-26 NOTE — TELEPHONE ENCOUNTER
Provider: LONDON MONTOYA   Caller: MRS JEAN   Relationship to Patient: WIFE   Phone Number: [phone]  Reason for Call: WIFE CALLED REG SOME TINGLING AND NUMBNESS THAT PATIENT IS EXPERIENCING FROM THE ELBOW DOWN AND DISCOMFORT ON THE SHOULDER THAT JUST HAD SX PERFORMED ON AND SHE IS WANTING TO SPEAK WITH SOMEONE REG THIS TO SEE IF THIS NORMAL OR WHAT NEEDS TO BE DONE. SPOUSE IS ASKING FOR A CALL BACK.     When did it start: STARTED THIS WEEKEND

## 2021-01-26 NOTE — TELEPHONE ENCOUNTER
Contacted patient in regards to his symptoms-we will proceed with Lyrica, also recommended some decompression techniques to take pressure off the cubital tunnel as it sounds like this is causing the majority of his symptoms with tingling down into his hand and he is noted radiating pains when he is massaging over the medial aspect of his elbow which is the site where he feels like the origin of the symptoms are.   We will see him on Friday but have asked him to contact me in the meantime if he has no further improvement with his symptoms or other issues.

## 2021-01-29 ENCOUNTER — OFFICE VISIT (OUTPATIENT)
Dept: ORTHOPEDIC SURGERY | Facility: CLINIC | Age: 46
End: 2021-01-29

## 2021-01-29 VITALS — BODY MASS INDEX: 27.85 KG/M2 | HEIGHT: 69 IN | WEIGHT: 188 LBS

## 2021-01-29 DIAGNOSIS — Z98.890 STATUS POST ARTHROSCOPY OF SHOULDER: Primary | ICD-10-CM

## 2021-01-29 DIAGNOSIS — M75.22 BICEPS TENDINITIS OF LEFT UPPER EXTREMITY: ICD-10-CM

## 2021-01-29 DIAGNOSIS — M75.122 COMPLETE TEAR OF LEFT ROTATOR CUFF, UNSPECIFIED WHETHER TRAUMATIC: ICD-10-CM

## 2021-01-29 DIAGNOSIS — M75.42 SUBACROMIAL IMPINGEMENT OF LEFT SHOULDER: ICD-10-CM

## 2021-01-29 PROCEDURE — 99024 POSTOP FOLLOW-UP VISIT: CPT | Performed by: NURSE PRACTITIONER

## 2021-01-29 RX ORDER — CYCLOBENZAPRINE HCL 10 MG
10 TABLET ORAL 3 TIMES DAILY PRN
Qty: 45 TABLET | Refills: 0 | Status: SHIPPED | OUTPATIENT
Start: 2021-01-29

## 2021-01-29 NOTE — PROGRESS NOTES
CC: F/u s/p left shoulder rotator cuff repair with use of bio inductive implant, biceps tenodesis, extensive debridement including glenohumeral and subacromial space,  DOS 01/20/2021    Interval History: Patient returns to clinic stating pain is doing fairly well, has been using sling as instructed, denies any numbness or tingling over left arm. No fevers, chills, or sweats, and no drainage from incisions noted.    Exam:   Left shoulder- incisions clean, dry, sutures in place   Positive sensation all distributions left hand and proximal lateral aspect arm, positive deltoid firing   Cap refill < 3 seconds, radial pulse 2+   Positive deltoid firing   Flex/extend fingers/thumb/wrist with 4+/5 strength, positive thumbs up, okay sign, cross finger adduction and abduction against resistance     Impression: s/p left shoulder rotator cuff repair with use of bio inductive implant, biceps tenodesis, extensive debridement including glenohumeral and subacromial space     Plan:  1. D/c sutures today and replace with steri-strips- may shower, no submerging wounds x 4 weeks.   2. F/u in 3 wks with Dr. Zuñiga.   3. Will start PT at 3 wk abigail on standard rotator cuff repair protocol . Continue use of sling x 4 weeks post-op. Work on finger and wrist ROM. May do gentle elbow ROM 2x/day while out of sling for showering or changing clothes.   4. All questions answered      New Medications Ordered This Visit   Medications   • cyclobenzaprine (FLEXERIL) 10 MG tablet     Sig: Take 1 tablet by mouth 3 (Three) Times a Day As Needed for Muscle Spasms.     Dispense:  45 tablet     Refill:  0       Orders Placed This Encounter   Procedures   • Ambulatory Referral to Physical Therapy POST OP     Referral Priority:   Routine     Referral Type:   Physical Therapy     Referral Reason:   Specialty Services Required     Requested Specialty:   Physical Therapy     Number of Visits Requested:   1   • SCANNED - TELEMETRY

## 2021-02-10 ENCOUNTER — HOSPITAL ENCOUNTER (OUTPATIENT)
Dept: PHYSICAL THERAPY | Facility: HOSPITAL | Age: 46
Setting detail: THERAPIES SERIES
Discharge: HOME | End: 2021-02-10

## 2021-02-10 DIAGNOSIS — Z98.890 STATUS POST ARTHROSCOPY OF LEFT SHOULDER: Primary | ICD-10-CM

## 2021-02-10 DIAGNOSIS — M75.122 COMPLETE TEAR OF LEFT ROTATOR CUFF, UNSPECIFIED WHETHER TRAUMATIC: ICD-10-CM

## 2021-02-10 DIAGNOSIS — M75.22 BICIPITAL TENDINITIS OF LEFT SHOULDER: ICD-10-CM

## 2021-02-10 DIAGNOSIS — M75.42 SUBACROMIAL IMPINGEMENT OF LEFT SHOULDER: ICD-10-CM

## 2021-02-10 PROCEDURE — 97161 PT EVAL LOW COMPLEX 20 MIN: CPT

## 2021-02-10 NOTE — THERAPY EVALUATION
Outpatient Physical Therapy Ortho Initial Evaluation   Veronica Littlejohn     Patient Name: Jose Barber  : 1975  MRN: [id number]  Today's Date: 2/10/2021      Visit Date: 02/10/2021    Patient Active Problem List   Diagnosis   • S/P ACL repair   • TIA due to embolism (CMS/HCC)   • Vertebral artery dissection (CMS/HCC)   • Mixed hyperlipidemia   • Complete tear of left rotator cuff   • Biceps tendinitis of left upper extremity   • Subacromial impingement of left shoulder   • Status post arthroscopy of shoulder, rotator cuff repair with use of bio inductive implant, biceps tenodesis, extensive debridement including glenohumeral and subacromial space,  DOS 2021        Past Medical History:   Diagnosis Date   • Elevated cholesterol    • Stroke (CMS/HCC)     TIA   • TIA (transient ischemic attack)     3 years ago        Past Surgical History:   Procedure Laterality Date   • BASAL CELL CARCINOMA EXCISION     • KNEE ARTHROSCOPY W/ ACL RECONSTRUCTION     • SHOULDER ARTHROSCOPY W/ ROTATOR CUFF REPAIR Left 2021    Procedure: SHOULDER ARTHROSCOPY, rotator cuff repair with bio-inductive implant, open biceps tenodesis, extensive debridement;  Surgeon: Pete Zuñiga MD;  Location: [facility];  Service: Orthopedics;  Laterality: Left;       Visit Dx:     ICD-10-CM ICD-9-CM   1. Status post arthroscopy of left shoulder  Z98.890 V45.89   2. Complete tear of left rotator cuff, unspecified whether traumatic  M75.122 727.61   3. Bicipital tendinitis of left shoulder  M75.22 726.12   4. Subacromial impingement of left shoulder  M75.42 726.19         Patient History     Row Name 02/10/21 1300             History    Chief Complaint  Difficulty with daily activities;Joint stiffness;Muscle tenderness;Muscle weakness;Pain;Tightness  -LN      Type of Pain  Shoulder pain left  -LN      Date Current Problem(s) Began  21  -LN      Brief Description of Current Complaint  Patient presents 3 weeks s/p left shoulder RCR  "with biceps tenodesis.  In sling during day; patient reported that he was having residual sx after the nerve block so they called Dr. Zuñiga and \"he said I could take sling off at night while in bed, but to put a pillow between my shoulder and my side.\" No longer having any N/T or residual affects of blok. Patient had injured left shoulder in July 2020- was picking up chair and chairs shifted and had shoulder pain and popping and then \"finished it off \" in October 2020 when lifting weights.   -LN      Previous treatment for THIS PROBLEM  Surgery;Medication;Rehabilitation PT prior to surgery including dry needling  -LN      Surgery Date:  01/20/21  -LN      Patient/Caregiver Goals  Relieve pain;Return to prior level of function;Improve mobility;Improve strength;Know what to do to help the symptoms  -LN      Patient/Caregiver Goals Comment  \"full use\"   -LN      Hand Dominance  right-handed  -LN      Occupation/sports/leisure activities  is an /- back to work- office work; likes to cycle/mountain biking; work out, and play golf  -LN      Patient seeing anyone else for problem(s)?  Ortho  -LN      How has patient tried to help current problem?  nerve pill and muscle relaxer; pain meds (now off all meds); ice after surgery  -LN      What clinical tests have you had for this problem?  MRI  -LN      Results of Clinical Tests  Full-thickness supraspinatus tendon tear just proximal to its insertion with mild myotendinous retraction. Type II acromial arch with acromial spur. Mild AC joint arthritis. -pre op MRI.   -LN      Related/Recent Hospitalizations  No  -LN      Surgery/Hospitalization  s/p left RCR and biceps tenodesis-  outpatient surgery  -LN         Pain     Pain Location  Shoulder left  -LN      Pain at Present  0 at rest  -LN      Pain at Best  0  -LN      Pain at Worst  3  -LN      Pain Frequency  Intermittent  -LN      Pain Description  Throbbing  -LN      What Performance " Factors Make the Current Problem(s) WORSE?  movement of left arm; lying down  -LN      What Performance Factors Make the Current Problem(s) BETTER?  rest; in sling  -LN      Tolerance Time- Standing  ok  -LN      Tolerance Time- Sitting  ok  -LN      Tolerance Time- Walking  ok  -LN      Tolerance Time- Lying  limited; unable to lie on left side  -LN      Is your sleep disturbed?  Yes less now; had been waking up every 2 hours until weekend  -LN      What position do you sleep in?  Supine had been sleeping in recliner  -LN      Difficulties at work?  able to do all job duties  -LN      Difficulties with ADL's?  limited with left arm  -LN      Difficulties with recreational activities?  yes  -LN         Fall Risk Assessment    Any falls in the past year:  No  -LN         Services    Prior Rehab/Home Health Experiences  Yes  -LN      When was the prior experience with Rehab/Home Health  left shoulder- pre op  -LN      Where was the prior experience with Rehab/Home Health  [facility]  -LN      Are you currently receiving Home Health services  No  -LN      Do you plan to receive Home Health services in the near future  No  -LN         Daily Activities    Primary Language  English  -LN      How does patient learn best?  Listening;Reading;Demonstration;Pictures/Video  -LN      Teaching needs identified  Home Exercise Program;Other (comment) Risks and benefits of treatment explained to patient.  -LN      Patient is concerned about/has problems with  Bed Mobility;Difficulty with self care (i.e. bathing, dressing, toileting:;Flexibility;Grasping objects lifting;Performing home management (household chores, shopping, care of dependents);Performing sports, recreation, and play activities;Reaching over head;Repetitive movements of the hand, arm, shoulder  -LN      Does patient have problems with the following?  None  -LN      Barriers to learning  None  -LN      Pt Participated in POC and Goals  Yes  -LN         Safety     Are you being hurt, hit, or frightened by anyone at home or in your life?  No  -LN      Are you being neglected by a caregiver  No  -LN      Have you had any of the following issues with  N/A  -LN        User Key  (r) = Recorded By, (t) = Taken By, (c) = Cosigned By    Initials Name Provider Type    LOYD Lange Afianakul Case, PT Physical Therapist          PT Ortho     Row Name 02/10/21 1400       Subjective Comments    Subjective Comments  Patient reports that his shoulder is doing pretty good; no c/o pain at rest/in sling. He does have some anterior shoulder pain ladonna at night. no c/o any N/T.  -LN       Precautions and Contraindications    Precautions/Limitations  shoulder precautions;other (see comments) s/p left RCR/biceps tenodesis- per protocol  -LN       Posture/Observations    Forward Head  Mild  -LN    Rounded Shoulders  Mild  -LN    Observations  Incision healing;Ecchymosis/bruising minimal bruising post upper arm  -LN    Posture/Observations Comments  Patient in sling and swath left UE.  -LN       Shoulder Impingement/Rotator Cuff Special Tests    Shoulder Impingement/Rotator Cuff Special Tests Comments  no special tests done secondary to recent surgery  -LN       Shoulder Girdle Palpation    Supraspinatus Insertion  Left:;Tender mild  -LN    Long Head of Biceps  Left:;Tender  -LN    Short Head of Biceps  Left:;Tender  -LN    Deltoid  Left:;Tender mild  -LN       Left Upper Ext    Lt Shoulder Abduction AROM  NT  -LN    Lt Shoulder Abduction PROM  160 degrees- scaption  -LN    Lt Shoulder Extension AROM  NT  -LN    Lt Shoulder Extension PROM  NT  -LN    Lt Shoulder Flexion AROM  NT  -LN    Lt Shoulder Flexion PROM  135 degrees  -LN    Lt Shoulder External Rotation AROM  NT  -LN    Lt Shoulder External Rotation PROM  34 degrees  -LN    Lt Shoulder Internal Rotation AROM  NT- per protocol  -LN    Lt Shoulder Internal Rotation PROM  NT  -LN    Lt Elbow Extension/Flexion AROM  NT per protocol  -LN    Lt Elbow  Extension/Flexion PROM  WNL  -LN    Lt Elbow Supination AROM  NT  -LN    Lt Elbow Supination PROM  WNL  -LN    Lt Elbow Pronation AROM  NT  -LN    Lt Elbow Pronation PROM  WNL  -LN       MMT (Manual Muscle Testing)    General MMT Comments  no MMT done secondary to recent surgery  -LN       Sensation    Sensation WNL?  WNL  -LN    Light Touch  No apparent deficits  -LN       Upper Extremity Flexibility    Upper Trapezius  Left:;Mildly limited  -LN    Pect Minor  Left:;Mildly limited  -LN    Pect Major  Left:;Mildly limited  -LN       Gait/Stairs (Locomotion)    Comment (Gait/Stairs)  Patient independent with all functional mobility and gait.  -LN      User Key  (r) = Recorded By, (t) = Taken By, (c) = Cosigned By    Initials Name Provider Type    Afia Blake, PT Physical Therapist                      Therapy Education  Education Details: Patient to work on HEP 2 x day as tolerated and use CP/MH PRN. Patient is to continue to wear sling for 1 more week per MD order. Advised patient to not do any AROM of left shoulder or elbow at this time.  Given: HEP, Symptoms/condition management, Pain management, Posture/body mechanics  Program: New  How Provided: Verbal, Demonstration, Written  Provided to: Patient  Level of Understanding: Teach back education performed, Verbalized, Demonstrated     PT OP Goals     Row Name 02/10/21 1400          PT Short Term Goals    STG Date to Achieve  02/24/21  -LN     STG 1  Patient to verbally report decreased left shoulder pain to 1-2/10.  -LN     STG 2  Patient to have improved left shoulder PROM to 150 degrees flexion, 170 degrees scaption, 50 degrees IR & ER.   -LN     STG 3  Patient independent with initial HEP issued by therapist.   -LN        Long Term Goals    LTG Date to Achieve  03/10/21  -LN     LTG 1  Patient to verbally report decreased left shoulder pain to 0-1/10 with ADLs and everyday activities.  -LN     LTG 2  Patient to have improved left shoulder PROM to  170 degrees flexion, 180 degrees scaption, 70 degrees IR & ER.   -LN     LTG 3  Patient independent with more advanced HEP issued by therapist.  -LN     LTG 4  Patient able to actively elevate left shoulder to 90 degrees to allow for improved functional use of left UE with ADLs.   -LN     LTG 5  Left elbow AROM WNL and painfree.   -LN        Time Calculation    PT Goal Re-Cert Due Date  03/10/21  -LN       User Key  (r) = Recorded By, (t) = Taken By, (c) = Cosigned By    Initials Name Provider Type    Afia Blake, PT Physical Therapist          PT Assessment/Plan     Row Name 02/10/21 1400          PT Assessment    Functional Limitations  Limitation in home management;Limitations in community activities;Limitations in functional capacity and performance;Performance in leisure activities;Performance in self-care ADL;Performance in work activities  -LN     Impairments  Impaired flexibility;Joint mobility;Muscle strength;Pain;Range of motion  -LN     Assessment Comments  Patient presents 3 weeks s/p left shoulder RCR and biceps tenodesis with mild pain, primarily anterior shoulder; decreased left shoulder ROM, decreased left shoulder/UE strength, and decreased functional use of left UE with ADLs and home and work activities.   -LN     Please refer to paper survey for additional self-reported information  Yes  -LN     Rehab Potential  Good  -LN     Patient/caregiver participated in establishment of treatment plan and goals  Yes  -LN     Patient would benefit from skilled therapy intervention  Yes  -LN        PT Plan    PT Frequency  2x/week;3x/week  -LN     Predicted Duration of Therapy Intervention (PT)  6-8 weeks  -LN     Planned CPT's?  PT EVAL LOW COMPLEXITY: 09476;PT THER PROC EA 15 MIN: 75034;PT MANUAL THERAPY EA 15 MIN: 78279;PT HOT OR COLD PACK TREAT MCARE;PT ELECTRICAL STIM UNATTEND:   -LN     Physical Therapy Interventions (Optional Details)  home exercise program;joint mobilization;manual  therapy techniques;modalities;patient/family education;postural re-education;ROM (Range of Motion);strengthening;stretching;taping  -LN     PT Plan Comments  See patient 2-3 x week for P/AA/AROM per protocol with therapeutic exercises with HEP; modalities PRN (IFC/CP/MH); manual therapy; patient education. Kinesiotape PRN.  Begin AAROM (pulleys and cane exercises) at 4 weeks post-op) on 2/17/2021.  -LN       User Key  (r) = Recorded By, (t) = Taken By, (c) = Cosigned By    Initials Name Provider Type    Afia Blake, PT Physical Therapist          Modalities     Row Name 02/10/21 1400             Precautions    Existing Precautions/Restrictions  shoulder;other (see comments) s/p left RCR & biceps tenodesis; per protocol  -LN         Ice    Ice Applied  Yes  -LN      Location  left shoulder with IFC with patient supine.  -LN      Rx Minutes  15 mins  -LN      Ice S/P Rx  Yes  -LN      Patient reports will apply ice at home to involved area  Yes  -LN         ELECTRICAL STIMULATION    Attended/Unattended  Unattended  -LN      Stimulation Type  IFC  -LN      Location/Electrode Placement/Other  left shoulder with CP.  -LN       PT E-Stim Unattended (Manual) Minutes  15  -LN        User Key  (r) = Recorded By, (t) = Taken By, (c) = Cosigned By    Initials Name Provider Type    Afia Blake, PT Physical Therapist        OP Exercises     Row Name 02/10/21 1400             Precautions    Existing Precautions/Restrictions  shoulder;other (see comments) s/p left RCR & biceps tenodesis; per protocol  -LN         Subjective Comments    Subjective Comments  Patient reports that his shoulder is doing pretty good; no c/o pain at rest/in sling. He does have some anterior shoulder pain ladonna at night. no c/o any N/T.  -LN         Subjective Pain    Able to rate subjective pain?  yes  -LN      Pre-Treatment Pain Level  0  -LN      Subjective Pain Comment  No pain at rest; 3/10 with movement  -LN          Exercise 1    Exercise Name 1  codman's cw & ccw; lateral and forward/back  -LN      Cueing 1  Verbal;Demo  -LN      Reps 1  10-20 reps each  -LN         Exercise 2    Exercise Name 2  scapular retraction  -LN      Cueing 2  Verbal;Tactile;Demo  -LN      Reps 2  5  -LN      Time 2  10 sec  -LN      Additional Comments  elbows kept at side  -LN         Exercise 3    Exercise Name 3  passive elbow flexion and extension- using right arm  -LN      Cueing 3  Verbal;Tactile;Demo  -LN      Reps 3  10  -LN      Time 3  3-5 sec  -LN         Exercise 4    Exercise Name 4  passive supination/pronation- using right arm  -LN      Cueing 4  Verbal;Tactile;Demo  -LN      Reps 4  10  -LN      Time 4  3-5 sec  -LN        User Key  (r) = Recorded By, (t) = Taken By, (c) = Cosigned By    Initials Name Provider Type    Afia Blake, PT Physical Therapist           Manual Rx (last 36 hours)      Manual Treatments     Row Name 02/10/21 1400             Manual Rx 1    Manual Rx 1 Location  PROM left shoulder for flexion/scaption/ER; no IR yet per protocol  -LN      Manual Rx 1 Type  patient supine  -LN      Manual Rx 1 Duration  10 reps each; ER limited to 45 degrees per protocol.  -LN         Manual Rx 2    Manual Rx 2 Location  PROM left elbow for flexion/extension and forearm supination/pronation  -LN      Manual Rx 2 Duration  5-6 each  -LN        User Key  (r) = Recorded By, (t) = Taken By, (c) = Cosigned By    Initials Name Provider Type    Afia Blake, PT Physical Therapist                      Outcome Measure Options: Quick DASH  Quick DASH  Open a tight or new jar.: Mild Difficulty  Do heavy household chores (e.g., wash walls, wash floors): Moderate Difficulty  Carry a shopping bag or briefcase: No Difficulty  Wash your back: Mild Difficulty  Use a knife to cut food: No Difficulty  Recreational activities in which you take some force or impact through your arm, should or hand (e.g. golf, hammering,  tennis, etc.): Unable  During the past week, to what extent has your arm, shoulder, or hand problem interfered with your normal social activites with family, friends, neighbors or groups?: Quite a bit  During the past week, were you limited in your work or other regular daily activities as a result of your arm, shoulder or hand problem?: Very limited  Arm, Shoulder, or hand pain: Mild  Tingling (pins and needles) in your arm, shoulder, or hand: Mild  During the past week, how much difficulty have you had sleeping because of the pain in your arm, shoulder or hand?: Moderate Difficiculty  Number of Questions Answered: 11  Quick DASH Score: 40.91  Work Module (Optional)  Using your usual technique for your work?: Moderate Difficulty()  Doing your usual work because of arm, shoulder or hand pain?: Moderate Difficulty  Doing your work as well as you would like?: Moderate Difficulty  Spending your usual amount of time doing your work?: Moderate Difficulty  Work Module Score: 50  Sports/Performing Arts Module (Optional)  Using your usual technique for playing your instrument or sport?: Moderate Difficulty(working out/cycling)  Playing your musical instrument or sport because of arm, shoulder or hand pain?: Moderate Difficulty  Playing your musical instrument or sport as well as you would like?: Moderate Difficulty  Spending your usual amount of time practising or playing your instrument or sport?: Moderate Difficulty  Sports/Performing Arts Score: 50         Time Calculation:     Start Time: 1355  Stop Time: 1500  Time Calculation (min): 65 min     Therapy Charges for Today     Code Description Service Date Service Provider Modifiers Qty    [id number] HC PT EVAL LOW COMPLEXITY 4 2/10/2021 Afia Lange, PT GP 1          PT G-Codes  Outcome Measure Options: Quick DASH  Quick DASH Score: 40.91         Afia Lange, PT  2/10/2021

## 2021-02-15 ENCOUNTER — APPOINTMENT (OUTPATIENT)
Dept: PHYSICAL THERAPY | Facility: HOSPITAL | Age: 46
End: 2021-02-15

## 2021-02-17 ENCOUNTER — HOSPITAL ENCOUNTER (OUTPATIENT)
Dept: PHYSICAL THERAPY | Facility: HOSPITAL | Age: 46
Setting detail: THERAPIES SERIES
Discharge: HOME | End: 2021-02-17

## 2021-02-17 DIAGNOSIS — Z98.890 STATUS POST ARTHROSCOPY OF LEFT SHOULDER: Primary | ICD-10-CM

## 2021-02-17 DIAGNOSIS — M75.22 BICIPITAL TENDINITIS OF LEFT SHOULDER: ICD-10-CM

## 2021-02-17 DIAGNOSIS — M75.42 SUBACROMIAL IMPINGEMENT OF LEFT SHOULDER: ICD-10-CM

## 2021-02-17 DIAGNOSIS — M75.122 COMPLETE TEAR OF LEFT ROTATOR CUFF, UNSPECIFIED WHETHER TRAUMATIC: ICD-10-CM

## 2021-02-17 PROCEDURE — 97140 MANUAL THERAPY 1/> REGIONS: CPT

## 2021-02-17 PROCEDURE — G0283 ELEC STIM OTHER THAN WOUND: HCPCS

## 2021-02-17 PROCEDURE — 97110 THERAPEUTIC EXERCISES: CPT

## 2021-02-17 NOTE — THERAPY TREATMENT NOTE
"    Outpatient Physical Therapy Ortho Treatment Note   Veronica Littlejohn     Patient Name: Jose Barber  : 1975  MRN: [id number]  Today's Date: 2021      Visit Date: 2021    Visit Dx:    ICD-10-CM ICD-9-CM   1. Status post arthroscopy of left shoulder  Z98.890 V45.89   2. Complete tear of left rotator cuff, unspecified whether traumatic  M75.122 727.61   3. Bicipital tendinitis of left shoulder  M75.22 726.12   4. Subacromial impingement of left shoulder  M75.42 726.19       Patient Active Problem List   Diagnosis   • S/P ACL repair   • TIA due to embolism (CMS/HCC)   • Vertebral artery dissection (CMS/HCC)   • Mixed hyperlipidemia   • Complete tear of left rotator cuff   • Biceps tendinitis of left upper extremity   • Subacromial impingement of left shoulder   • Status post arthroscopy of shoulder, rotator cuff repair with use of bio inductive implant, biceps tenodesis, extensive debridement including glenohumeral and subacromial space,  DOS 2021        Past Medical History:   Diagnosis Date   • Elevated cholesterol    • Stroke (CMS/HCC)     TIA   • TIA (transient ischemic attack)     3 years ago        Past Surgical History:   Procedure Laterality Date   • BASAL CELL CARCINOMA EXCISION     • KNEE ARTHROSCOPY W/ ACL RECONSTRUCTION     • SHOULDER ARTHROSCOPY W/ ROTATOR CUFF REPAIR Left 2021    Procedure: SHOULDER ARTHROSCOPY, rotator cuff repair with bio-inductive implant, open biceps tenodesis, extensive debridement;  Surgeon: Pete Zuñiga MD;  Location: [facility];  Service: Orthopedics;  Laterality: Left;       PT Ortho     Row Name 21 1000       Subjective Comments    Subjective Comments  Patient reports that his shoulder is doing well and he is sleeping better with making sure his shoulder is supported by a pillow.   \"I see the doctor on Friday.\" -LN       Precautions and Contraindications    Precautions/Limitations  shoulder precautions  -LN       Subjective Pain    Able " to rate subjective pain?  yes  -LN    Pre-Treatment Pain Level  0  -LN    Post-Treatment Pain Level  2  -LN    Subjective Pain Comment  No present pain; increased up to 4/10 with motion.   -LN      User Key  (r) = Recorded By, (t) = Taken By, (c) = Cosigned By    Initials Name Provider Type    Afia Blake, PT Physical Therapist                      PT Assessment/Plan     Row Name 02/17/21 1000          PT Assessment    Assessment Comments  Patient tolerated treatment very well and improved PROM in all planes noted. He tolerated AAROM exercises well with only min c/o left shoulder pain. Left elbow AA/PROM WNL all planes. Patient is sleeping better.   -LN        PT Plan    PT Frequency  2x/week;3x/week  -LN     PT Plan Comments  Continue per POC. Progress per protocol and as tolerated. Modalities PRN. Patient education. Begin AA cane exercise & pulleys for abd/scaption as tolerated next visit. Patient returns to MD on Friday, 2/19/21. -LN       User Key  (r) = Recorded By, (t) = Taken By, (c) = Cosigned By    Initials Name Provider Type    Afia Blake, PT Physical Therapist          Modalities     Row Name 02/17/21 1000             Precautions    Existing Precautions/Restrictions  shoulder;other (see comments) s/p left RCR and biceps tenodesis; per protocol  -LN         Moist Heat    Patient denies application of MH  Yes  -LN         Ice    Ice Applied  Yes  -LN      Location  left shoulder with IFC with patient supine.  -LN      Rx Minutes  15 mins  -LN      Ice S/P Rx  Yes  -LN      Patient reports will apply ice at home to involved area  Yes  -LN         ELECTRICAL STIMULATION    Attended/Unattended  Unattended  -LN      Stimulation Type  IFC  -LN      Location/Electrode Placement/Other  left shoulder with CP.  -LN       PT E-Stim Unattended (Manual) Minutes  15  -LN        User Key  (r) = Recorded By, (t) = Taken By, (c) = Cosigned By    Initials Name Provider Type    LOYD  Afia Lange, PT Physical Therapist        OP Exercises     Row Name 02/17/21 1000             Precautions    Existing Precautions/Restrictions  shoulder;other (see comments) s/p left RCR and biceps tenodesis; per protocol  -LN         Subjective Comments    Subjective Comments  Patient reports that his shoulder is doing well and he is sleeping better with making sure his shoulder is supported by a pillow.   -LN         Subjective Pain    Able to rate subjective pain?  yes  -LN      Pre-Treatment Pain Level  0  -LN      Post-Treatment Pain Level  2  -LN      Subjective Pain Comment  No present pain; increased up to 4/10 with motion.   -LN         Exercise 1    Exercise Name 1  codman's cw & ccw; lateral and forward/back  -LN      Cueing 1  Verbal;Demo  -LN      Reps 1  10-20 reps each  -LN      Additional Comments  HEP  -LN         Exercise 2    Exercise Name 2  scapular retraction  -LN      Cueing 2  Verbal;Tactile;Demo  -LN      Reps 2  5  -LN      Time 2  10 sec  -LN      Additional Comments  HEP  -LN         Exercise 3    Exercise Name 3  passive elbow flexion and extension- using right arm  -LN      Cueing 3  Verbal;Tactile;Demo  -LN      Reps 3  10  -LN      Time 3  3-5 sec  -LN      Additional Comments  HEP  -LN         Exercise 4    Exercise Name 4  passive supination/pronation- using right arm  -LN      Cueing 4  Verbal;Tactile;Demo  -LN      Reps 4  10  -LN      Time 4  3-5 sec  -LN      Additional Comments  HEP  -LN         Exercise 5    Exercise Name 5  supine cane AA shoulder flexion  -LN      Cueing 5  Verbal;Tactile;Demo  -LN      Reps 5  10  -LN      Time 5  5 sec  -LN         Exercise 6    Exercise Name 6  supine cane AA shoulder ER  -LN      Cueing 6  Verbal;Tactile;Demo  -LN      Reps 6  10  -LN      Time 6  5 sec  -LN      Additional Comments  instructed patient to not push this motion  -LN         Exercise 7    Exercise Name 7  pulleys- flexion  -LN      Cueing 7  Verbal;Tactile;Demo   -LN      Time 7  3 minutes  -LN        User Key  (r) = Recorded By, (t) = Taken By, (c) = Cosigned By    Initials Name Provider Type    Afia Blake, PT Physical Therapist                      Manual Rx (last 36 hours)      Manual Treatments     Row Name 02/17/21 0900             Manual Rx 1    Manual Rx 1 Location  PROM left shoulder for flexion/scaption/ER/IR  -LN      Manual Rx 1 Type  patient supine  -LN      Manual Rx 1 Duration  10 reps each; no excessive ER stretch per protocol  -LN         Manual Rx 2    Manual Rx 2 Location  PROM left elbow for flexion/extension and forearm supination/pronation  -LN      Manual Rx 2 Duration  5-6 each  -LN        User Key  (r) = Recorded By, (t) = Taken By, (c) = Cosigned By    Initials Name Provider Type    Afia Blake, PT Physical Therapist              Therapy Education  Education Details: Patient to continue with HEP 2 x day as tolerated and use CP/MH PRN. Patient to continue to wear sling until he sees MD on Friday, 2/19/21.  Given: HEP, Symptoms/condition management, Pain management, Posture/body mechanics  Program: New, Reinforced  How Provided: Verbal, Demonstration, Written  Provided to: Patient  Level of Understanding: Teach back education performed, Verbalized, Demonstrated              Time Calculation:   Start Time: 1002  Stop Time: 1100  Time Calculation (min): 58 min  Therapy Charges for Today     Code Description Service Date Service Provider Modifiers Qty    43187911514 HC PT ELECTRICAL STIM UNATTENDED 2/17/2021 Afia Lange, PT  1    06468795065 HC PT MANUAL THERAPY EA 15 MIN 2/17/2021 Afia Lange, PT GP 1    45457803356 HC PT THER PROC EA 15 MIN 2/17/2021 Afia Lange, PT GP 1                    Afia Lange, PT  2/17/2021

## 2021-02-19 ENCOUNTER — HOSPITAL ENCOUNTER (OUTPATIENT)
Dept: PHYSICAL THERAPY | Facility: HOSPITAL | Age: 46
Setting detail: THERAPIES SERIES
Discharge: HOME | End: 2021-02-19

## 2021-02-19 ENCOUNTER — OFFICE VISIT (OUTPATIENT)
Dept: ORTHOPEDIC SURGERY | Facility: CLINIC | Age: 46
End: 2021-02-19

## 2021-02-19 VITALS — WEIGHT: 188 LBS | BODY MASS INDEX: 27.85 KG/M2 | HEIGHT: 69 IN

## 2021-02-19 DIAGNOSIS — Z98.890 STATUS POST ARTHROSCOPY OF SHOULDER: Primary | ICD-10-CM

## 2021-02-19 DIAGNOSIS — M75.122 COMPLETE TEAR OF LEFT ROTATOR CUFF, UNSPECIFIED WHETHER TRAUMATIC: ICD-10-CM

## 2021-02-19 DIAGNOSIS — Z98.890 STATUS POST ARTHROSCOPY OF LEFT SHOULDER: Primary | ICD-10-CM

## 2021-02-19 PROCEDURE — 99024 POSTOP FOLLOW-UP VISIT: CPT | Performed by: ORTHOPAEDIC SURGERY

## 2021-02-19 PROCEDURE — 97140 MANUAL THERAPY 1/> REGIONS: CPT | Performed by: PHYSICAL THERAPIST

## 2021-02-19 PROCEDURE — 97110 THERAPEUTIC EXERCISES: CPT | Performed by: PHYSICAL THERAPIST

## 2021-02-19 NOTE — PROGRESS NOTES
CC: F/u s/p left shoulder rotator cuff repair and biceps tenodesis  DOS 1/20/2021    Interval History: Patient returns to clinic stating pain is doing fairly well, has been using sling as instructed, denies any numbness or tingling over left arm. He reports some stiffness and inflammation with the arm which is exacerbated by motion but improved by stretching with physical therapy. No fevers, chills, or sweats, and no drainage from incisions noted. He has started into physical therapy.    Exam:   Left shoulder- incisions healing well, some irritation around scab formation   Tolerates passive FF- 120, ER- 40   Positive sensation all distributions left hand and proximal lateral aspect arm, positive deltoid firing   Cap refill < 3 seconds, radial pulse 2+   Positive deltoid firing   Flex/extend fingers/thumb/wrist with 4+/5 strength, positive thumbs up, okay sign, cross finger adduction and abduction against resistance     Impression: s/p left shoulder rotator cuff repair and biceps tenodesis    Plan:  1. Continue PT for work on ROM and progressing into strengthening per protocol.  2. Discontinue sling.  3. Instructed on passive ROM exercises to be done multiple times daily at home. Patient may resume running and cycling but should not resume rowing machine at this time.  4. Instructed patient to clean incision with peroxide as needed.  5. Recommended ice and/or heat to reduce inflammation as needed.  6. F/u in 4 weeks to evaluate motion and progress with PT.  7. All questions answered.      No orders of the defined types were placed in this encounter.      No orders of the defined types were placed in this encounter.    SCRIBE ATTESTATION:  Gal ALAN, attest that all medical record entries for this patient were documented by me acting as a medical scribe for Pete Zuñiga MD.    PROVIDER ATTESTATION:  Pete ALAN MD, personally performed the services described in this documentation. All medical  record entries made by the scribe were at my direction and in my presence. I have reviewed the chart and discharge instructions and agree that the record reflects my personal performance and is accurate and complete.  Pete Zuñiga MD.    Electronically signed: Pete Zuñiga MD 2/21/2021 21:43 EST

## 2021-02-19 NOTE — THERAPY TREATMENT NOTE
Outpatient Physical Therapy Ortho Treatment Note   Veronica Littlejohn     Patient Name: Jose Barber  : 1975  MRN: [id number]  Today's Date: 2021      Visit Date: 2021    Visit Dx:    ICD-10-CM ICD-9-CM   1. Status post arthroscopy of left shoulder  Z98.890 V45.89       Patient Active Problem List   Diagnosis   • S/P ACL repair   • TIA due to embolism (CMS/HCC)   • Vertebral artery dissection (CMS/HCC)   • Mixed hyperlipidemia   • Complete tear of left rotator cuff   • Biceps tendinitis of left upper extremity   • Subacromial impingement of left shoulder   • Status post arthroscopy of shoulder, rotator cuff repair with use of bio inductive implant, biceps tenodesis, extensive debridement including glenohumeral and subacromial space,  DOS 2021        Past Medical History:   Diagnosis Date   • Elevated cholesterol    • Stroke (CMS/HCC)     TIA   • TIA (transient ischemic attack)     3 years ago        Past Surgical History:   Procedure Laterality Date   • BASAL CELL CARCINOMA EXCISION     • KNEE ARTHROSCOPY W/ ACL RECONSTRUCTION     • SHOULDER ARTHROSCOPY W/ ROTATOR CUFF REPAIR Left 2021    Procedure: SHOULDER ARTHROSCOPY, rotator cuff repair with bio-inductive implant, open biceps tenodesis, extensive debridement;  Surgeon: Pete Zuñiga MD;  Location: [facility];  Service: Orthopedics;  Laterality: Left;       PT Ortho     Row Name 21 2932       Subjective Comments    Subjective Comments  Pt states his shoulder is feeling pretty good.   -      User Key  (r) = Recorded By, (t) = Taken By, (c) = Cosigned By    Initials Name Provider Type    Luis Fernando Oconnor, PT Physical Therapist                      PT Assessment/Plan     Row Name 21 7765          PT Assessment    Assessment Comments  Pt is doing very well with good PROM and good AAROM noted with his stretches.  -        PT Plan    PT Plan Comments  Pt sees MD this morning. Will continue therapy 2x weekly.  -        User Key  (r) = Recorded By, (t) = Taken By, (c) = Cosigned By    Initials Name Provider Type     uLis Fernando Jennings PT Physical Therapist            OP Exercises     Row Name 02/19/21 0910             Subjective Comments    Subjective Comments  Pt states his shoulder is feeling pretty good.   -GC         Exercise 1    Exercise Name 1  Cane stretch-FLEX  -GC      Cueing 1  Verbal;Demo  -GC      Reps 1  15  -GC      Time 1  5 secs  -GC         Exercise 2    Exercise Name 2  Cane stretch-ABD  -GC      Cueing 2  Verbal;Demo  -GC      Reps 2  15  -GC      Time 2  5 secs  -GC         Exercise 3    Exercise Name 3  Cane stretch-ER  -GC      Cueing 3  Verbal;Demo  -GC      Reps 3  15  -GC      Time 3  5 secs  -GC         Exercise 4    Exercise Name 4  Pulley-FLEX  -GC      Cueing 4  Verbal;Demo  -GC      Time 4  4 min  -GC         Exercise 5    Exercise Name 5  Pulley-Scaption  -GC      Cueing 5  Verbal;Demo  -GC      Time 5  4 min  -GC        User Key  (r) = Recorded By, (t) = Taken By, (c) = Cosigned By    Initials Name Provider Type     Luis Fernando Jennings PT Physical Therapist                      Manual Rx (last 36 hours)      Manual Treatments     Row Name 02/19/21 0910             Manual Rx 1    Manual Rx 1 Location  PROM left shoulder for flexion/scaption/ER/IR  -GC      Manual Rx 1 Type  patient supine  -GC      Manual Rx 1 Duration  15 min  -GC         Manual Rx 2    Manual Rx 2 Location  PROM left elbow for flexion/extension and forearm supination/pronation  -GC      Manual Rx 2 Duration  5-6 each  -GC        User Key  (r) = Recorded By, (t) = Taken By, (c) = Cosigned By    Initials Name Provider Type     Luis Fernando Jennings PT Physical Therapist                             Time Calculation:   Start Time: 0910  Stop Time: 1006  Time Calculation (min): 56 min  Therapy Charges for Today     Code Description Service Date Service Provider Modifiers Qty    67948439070  PT THER PROC EA 15 MIN 2/19/2021 Luis Fernando Jennings  PT GP 1    [id number]  PT MANUAL THERAPY EA 15 MIN 2/19/2021 Luis Fernando Jennings, PT GP 1                    Luis Fernando Jennings, PT  2/19/2021

## 2021-02-22 ENCOUNTER — HOSPITAL ENCOUNTER (OUTPATIENT)
Dept: PHYSICAL THERAPY | Facility: HOSPITAL | Age: 46
Setting detail: THERAPIES SERIES
Discharge: HOME | End: 2021-02-22

## 2021-02-22 DIAGNOSIS — Z98.890 STATUS POST ARTHROSCOPY OF LEFT SHOULDER: Primary | ICD-10-CM

## 2021-02-22 PROCEDURE — 97110 THERAPEUTIC EXERCISES: CPT | Performed by: PHYSICAL THERAPIST

## 2021-02-22 PROCEDURE — 97140 MANUAL THERAPY 1/> REGIONS: CPT | Performed by: PHYSICAL THERAPIST

## 2021-02-22 NOTE — THERAPY TREATMENT NOTE
Outpatient Physical Therapy Ortho Treatment Note   Veronica Littlejohn     Patient Name: Jose Barber  : 1975  MRN: [id number]  Today's Date: 2021      Visit Date: 2021    Visit Dx:    ICD-10-CM ICD-9-CM   1. Status post arthroscopy of left shoulder  Z98.890 V45.89       Patient Active Problem List   Diagnosis   • S/P ACL repair   • TIA due to embolism (CMS/HCC)   • Vertebral artery dissection (CMS/HCC)   • Mixed hyperlipidemia   • Complete tear of left rotator cuff   • Biceps tendinitis of left upper extremity   • Subacromial impingement of left shoulder   • Status post arthroscopy of shoulder, rotator cuff repair with use of bio inductive implant, biceps tenodesis, extensive debridement including glenohumeral and subacromial space,  DOS 2021        Past Medical History:   Diagnosis Date   • Elevated cholesterol    • Stroke (CMS/HCC)     TIA   • TIA (transient ischemic attack)     3 years ago        Past Surgical History:   Procedure Laterality Date   • BASAL CELL CARCINOMA EXCISION     • KNEE ARTHROSCOPY W/ ACL RECONSTRUCTION     • SHOULDER ARTHROSCOPY W/ ROTATOR CUFF REPAIR Left 2021    Procedure: SHOULDER ARTHROSCOPY, rotator cuff repair with bio-inductive implant, open biceps tenodesis, extensive debridement;  Surgeon: Pete Zuñiga MD;  Location: [facility];  Service: Orthopedics;  Laterality: Left;       PT Ortho     Row Name 21 1400       Subjective Comments    Subjective Comments  Pt states his shoulder is a little sore after last visit.  -      User Key  (r) = Recorded By, (t) = Taken By, (c) = Cosigned By    Initials Name Provider Type     Luis Fernando Jennings, PT Physical Therapist                      PT Assessment/Plan     Row Name 21 1400          PT Assessment    Assessment Comments  Pt is doing well with increasing shoulder ROM noted with his stretching.  -        PT Plan    PT Plan Comments  Pt is to continue his HEP 2x daily.  -       User Key  (r) =  Recorded By, (t) = Taken By, (c) = Cosigned By    Initials Name Provider Type     Luis Fernando Jennings, PT Physical Therapist            OP Exercises     Row Name 02/22/21 1400             Subjective Comments    Subjective Comments  Pt states his shoulder is a little sore after last visit.  -GC         Exercise 1    Exercise Name 1  Cane stretch-FLEX  -GC      Cueing 1  Verbal;Demo  -GC      Reps 1  15  -GC      Time 1  5 secs  -GC         Exercise 2    Exercise Name 2  Cane stretch-ABD  -GC      Cueing 2  Verbal;Demo  -GC      Reps 2  15  -GC      Time 2  5 secs  -GC         Exercise 3    Exercise Name 3  Cane stretch-ER  -GC      Cueing 3  Verbal;Demo  -GC      Reps 3  15  -GC      Time 3  5 secs  -GC         Exercise 4    Exercise Name 4  Pulley-FLEX  -GC      Cueing 4  Verbal;Demo  -GC      Time 4  4 min  -GC         Exercise 5    Exercise Name 5  Pulley-Scaption  -GC      Cueing 5  Verbal;Demo  -GC      Time 5  4 min  -GC        User Key  (r) = Recorded By, (t) = Taken By, (c) = Cosigned By    Initials Name Provider Type     Luis Fernando Jennings, PT Physical Therapist                      Manual Rx (last 36 hours)      Manual Treatments     Row Name 02/22/21 1400             Manual Rx 1    Manual Rx 1 Location  PROM left shoulder for flexion/scaption/ER/IR  -GC      Manual Rx 1 Type  patient supine  -GC      Manual Rx 1 Duration  15 min  -GC        User Key  (r) = Recorded By, (t) = Taken By, (c) = Cosigned By    Initials Name Provider Type     Luis Fernando Jennings PT Physical Therapist                             Time Calculation:   Start Time: 1400  Stop Time: 1449  Time Calculation (min): 49 min  Therapy Charges for Today     Code Description Service Date Service Provider Modifiers Qty    00574647157  PT THER PROC EA 15 MIN 2/22/2021 Luis Fernando Jennings, PT GP 1    08937989000 HC PT MANUAL THERAPY EA 15 MIN 2/22/2021 Luis Fernando Jennings, PT GP 1                    Luis Fernando Jennings PT  2/22/2021

## 2021-02-26 ENCOUNTER — HOSPITAL ENCOUNTER (OUTPATIENT)
Dept: PHYSICAL THERAPY | Facility: HOSPITAL | Age: 46
Setting detail: THERAPIES SERIES
Discharge: HOME | End: 2021-02-26

## 2021-02-26 DIAGNOSIS — Z98.890 STATUS POST ARTHROSCOPY OF LEFT SHOULDER: Primary | ICD-10-CM

## 2021-02-26 PROCEDURE — 97140 MANUAL THERAPY 1/> REGIONS: CPT | Performed by: PHYSICAL THERAPIST

## 2021-02-26 PROCEDURE — 97110 THERAPEUTIC EXERCISES: CPT | Performed by: PHYSICAL THERAPIST

## 2021-02-26 NOTE — THERAPY TREATMENT NOTE
Outpatient Physical Therapy Ortho Treatment Note   [place]     Patient Name: Jose Barber  : 1975  MRN: [id number]  Today's Date: 2021      Visit Date: 2021    Visit Dx:    ICD-10-CM ICD-9-CM   1. Status post arthroscopy of left shoulder  Z98.890 V45.89       Patient Active Problem List   Diagnosis   • S/P ACL repair   • TIA due to embolism (CMS/HCC)   • Vertebral artery dissection (CMS/HCC)   • Mixed hyperlipidemia   • Complete tear of left rotator cuff   • Biceps tendinitis of left upper extremity   • Subacromial impingement of left shoulder   • Status post arthroscopy of shoulder, rotator cuff repair with use of bio inductive implant, biceps tenodesis, extensive debridement including glenohumeral and subacromial space,  DOS 2021        Past Medical History:   Diagnosis Date   • Elevated cholesterol    • Stroke (CMS/HCC)     TIA   • TIA (transient ischemic attack)     3 years ago        Past Surgical History:   Procedure Laterality Date   • BASAL CELL CARCINOMA EXCISION     • KNEE ARTHROSCOPY W/ ACL RECONSTRUCTION     • SHOULDER ARTHROSCOPY W/ ROTATOR CUFF REPAIR Left 2021    Procedure: SHOULDER ARTHROSCOPY, rotator cuff repair with bio-inductive implant, open biceps tenodesis, extensive debridement;  Surgeon: Pete Zuñiga MD;  Location: [facility];  Service: Orthopedics;  Laterality: Left;                       PT Assessment/Plan     Row Name 21 1040          PT Assessment    Assessment Comments  Pt is showing increased shoulder ROM with the stretching.  -        PT Plan    PT Plan Comments  Pt is to continue his HEP daily. Will begin gentle strengthening next week.  -       User Key  (r) = Recorded By, (t) = Taken By, (c) = Cosigned By    Initials Name Provider Type    Luis Fernando Oconnor, PT Physical Therapist          Modalities     Row Name 21 1040             Subjective Comments    Subjective Comments  Pt states his shoulder is feeling pretty good.   -GC         Moist Heat    MH Applied  Yes  -GC      Location  left shoulder with pt sitting  -GC      Rx Minutes  10 mins  -GC      MH Prior to Rx  Yes  -GC        User Key  (r) = Recorded By, (t) = Taken By, (c) = Cosigned By    Initials Name Provider Type    Luis Fernando Oconnor, PT Physical Therapist        OP Exercises     Row Name 02/26/21 1040             Subjective Comments    Subjective Comments  Pt states his shoulder is feeling pretty good.  -GC        User Key  (r) = Recorded By, (t) = Taken By, (c) = Cosigned By    Initials Name Provider Type    Luis Fernando Oconnor, PT Physical Therapist                      Manual Rx (last 36 hours)      Manual Treatments     Row Name 02/26/21 1040             Manual Rx 1    Manual Rx 1 Location  PROM left shoulder for flexion/scaption/ER/IR  -GC      Manual Rx 1 Type  patient supine  -GC      Manual Rx 1 Duration  15 min  -GC        User Key  (r) = Recorded By, (t) = Taken By, (c) = Cosigned By    Initials Name Provider Type    Luis Fernando Oconnor, PT Physical Therapist                             Time Calculation:   Start Time: 1040  Stop Time: 1136  Time Calculation (min): 56 min  Therapy Charges for Today     Code Description Service Date Service Provider Modifiers Qty    12249536449 HC PT THER PROC EA 15 MIN 2/26/2021 Luis Fernando Jennings, PT GP 1    90180494716 HC PT MANUAL THERAPY EA 15 MIN 2/26/2021 Luis Fernando Jennings, PT GP 1                    Luis Fernando Jennings PT  2/26/2021

## 2021-03-01 ENCOUNTER — HOSPITAL ENCOUNTER (OUTPATIENT)
Dept: PHYSICAL THERAPY | Facility: HOSPITAL | Age: 46
Setting detail: THERAPIES SERIES
Discharge: HOME | End: 2021-03-01

## 2021-03-01 DIAGNOSIS — Z98.890 STATUS POST ARTHROSCOPY OF LEFT SHOULDER: Primary | ICD-10-CM

## 2021-03-01 PROCEDURE — 97110 THERAPEUTIC EXERCISES: CPT | Performed by: PHYSICAL THERAPIST

## 2021-03-01 PROCEDURE — 97140 MANUAL THERAPY 1/> REGIONS: CPT | Performed by: PHYSICAL THERAPIST

## 2021-03-04 ENCOUNTER — HOSPITAL ENCOUNTER (OUTPATIENT)
Dept: PHYSICAL THERAPY | Facility: HOSPITAL | Age: 46
Setting detail: THERAPIES SERIES
Discharge: HOME | End: 2021-03-04

## 2021-03-04 DIAGNOSIS — Z98.890 STATUS POST ARTHROSCOPY OF LEFT SHOULDER: Primary | ICD-10-CM

## 2021-03-04 PROCEDURE — 97140 MANUAL THERAPY 1/> REGIONS: CPT | Performed by: PHYSICAL THERAPIST

## 2021-03-04 PROCEDURE — 97110 THERAPEUTIC EXERCISES: CPT | Performed by: PHYSICAL THERAPIST

## 2021-03-04 NOTE — THERAPY TREATMENT NOTE
Outpatient Physical Therapy Ortho Treatment Note   Veronica Littlejohn     Patient Name: Jose Barber  : 1975  MRN: [id number]  Today's Date: 3/4/2021      Visit Date: 2021    Visit Dx:    ICD-10-CM ICD-9-CM   1. Status post arthroscopy of left shoulder  Z98.890 V45.89       Patient Active Problem List   Diagnosis   • S/P ACL repair   • TIA due to embolism (CMS/HCC)   • Vertebral artery dissection (CMS/HCC)   • Mixed hyperlipidemia   • Complete tear of left rotator cuff   • Biceps tendinitis of left upper extremity   • Subacromial impingement of left shoulder   • Status post arthroscopy of shoulder, rotator cuff repair with use of bio inductive implant, biceps tenodesis, extensive debridement including glenohumeral and subacromial space,  DOS 2021        Past Medical History:   Diagnosis Date   • Elevated cholesterol    • Stroke (CMS/HCC)     TIA   • TIA (transient ischemic attack)     3 years ago        Past Surgical History:   Procedure Laterality Date   • BASAL CELL CARCINOMA EXCISION     • KNEE ARTHROSCOPY W/ ACL RECONSTRUCTION     • SHOULDER ARTHROSCOPY W/ ROTATOR CUFF REPAIR Left 2021    Procedure: SHOULDER ARTHROSCOPY, rotator cuff repair with bio-inductive implant, open biceps tenodesis, extensive debridement;  Surgeon: Pete Zuñiga MD;  Location: [facility];  Service: Orthopedics;  Laterality: Left;       PT Ortho     Row Name 21 0932       Subjective Comments    Subjective Comments  Pt states his shoulder is feeling pretty good.  -GC       Left Upper Ext    Lt Shoulder Abduction AROM  134 degrees  -GC    Lt Shoulder Flexion AROM  136 degrees  -      User Key  (r) = Recorded By, (t) = Taken By, (c) = Cosigned By    Initials Name Provider Type     Luis Fernando Jennings, PT Physical Therapist                      PT Assessment/Plan     Row Name 21 0952          PT Assessment    Assessment Comments  Pt tolerated gentle strengthening well.  -GC        PT Plan    PT Plan  Comments  Pt is to do his HEP 2x daily. will progress strengthening as tolerated.  -GC       User Key  (r) = Recorded By, (t) = Taken By, (c) = Cosigned By    Initials Name Provider Type    GC Luis Fernando Jennings PT Physical Therapist            OP Exercises     Row Name 03/04/21 0950             Subjective Comments    Subjective Comments  Pt states his shoulder is feeling pretty good.  -GC         Exercise 1    Exercise Name 1  Cane stretch-FLEX  -GC      Cueing 1  Verbal;Demo  -GC      Reps 1  15  -GC      Time 1  5 secs  -GC         Exercise 2    Exercise Name 2  Cane stretch-ABD  -GC      Cueing 2  Verbal;Demo  -GC      Reps 2  15  -GC      Time 2  5 secs  -GC         Exercise 3    Exercise Name 3  Cane stretch-ER  -GC      Cueing 3  Verbal;Demo  -GC      Reps 3  15  -GC      Time 3  5 secs  -GC         Exercise 4    Exercise Name 4  Pulley-FLEX  -GC      Cueing 4  Verbal;Demo  -GC      Time 4  4 min  -GC         Exercise 5    Exercise Name 5  Pulley-Scaption  -GC      Cueing 5  Verbal;Demo  -GC      Time 5  4 min  -GC         Exercise 6    Exercise Name 6  Shoulder ER vs theraband  -GC      Cueing 6  Verbal;Demo  -GC      Reps 6  25  -GC      Time 6  green  -GC         Exercise 7    Exercise Name 7  Shoulder IR vs theraband  -GC      Cueing 7  Verbal;Demo  -GC      Reps 7  25  -GC      Time 7  green  -GC         Exercise 8    Exercise Name 8  Shoulder Rows vs theraband  -GC      Cueing 8  Verbal;Demo  -GC      Reps 8  25  -GC      Time 8  green  -GC         Exercise 9    Exercise Name 9  Shoulder EXT vs theraband  -GC      Cueing 9  Verbal;Demo  -GC      Reps 9  25  -GC      Time 9  green  -GC        User Key  (r) = Recorded By, (t) = Taken By, (c) = Cosigned By    Initials Name Provider Type    GC Luis Fernando Jennings PT Physical Therapist                      Manual Rx (last 36 hours)      Manual Treatments     Row Name 03/04/21 0950             Manual Rx 1    Manual Rx 1 Location  PROM left shoulder for  flexion/scaption/ER/IR  -GC      Manual Rx 1 Type  patient supine  -GC      Manual Rx 1 Duration  15 min  -        User Key  (r) = Recorded By, (t) = Taken By, (c) = Cosigned By    Initials Name Provider Type     Luis Fernando Jennings, PT Physical Therapist                             Time Calculation:   Start Time: 0950  Stop Time: 1057  Time Calculation (min): 67 min  Therapy Charges for Today     Code Description Service Date Service Provider Modifiers Qty    [id number]  PT THER PROC EA 15 MIN 3/4/2021 Luis Fernando Jennings, PT GP 1    [id number]  PT MANUAL THERAPY EA 15 MIN 3/4/2021 Luis Fernando Jennings, PT GP 1                    Luis Fernando Jennings PT  3/4/2021

## 2021-03-08 ENCOUNTER — HOSPITAL ENCOUNTER (OUTPATIENT)
Dept: PHYSICAL THERAPY | Facility: HOSPITAL | Age: 46
Setting detail: THERAPIES SERIES
Discharge: HOME | End: 2021-03-08

## 2021-03-08 DIAGNOSIS — Z98.890 STATUS POST ARTHROSCOPY OF LEFT SHOULDER: Primary | ICD-10-CM

## 2021-03-08 PROCEDURE — 97110 THERAPEUTIC EXERCISES: CPT | Performed by: PHYSICAL THERAPIST

## 2021-03-08 PROCEDURE — 97140 MANUAL THERAPY 1/> REGIONS: CPT | Performed by: PHYSICAL THERAPIST

## 2021-03-08 NOTE — THERAPY TREATMENT NOTE
Outpatient Physical Therapy Ortho Treatment Note  MIRYAM Kovacs     Patient Name: Jose Barber  : 1975  MRN: [id number]  Today's Date: 3/8/2021      Visit Date: 2021    Visit Dx:    ICD-10-CM ICD-9-CM   1. Status post arthroscopy of left shoulder  Z98.890 V45.89       Patient Active Problem List   Diagnosis   • S/P ACL repair   • TIA due to embolism (CMS/HCC)   • Vertebral artery dissection (CMS/HCC)   • Mixed hyperlipidemia   • Complete tear of left rotator cuff   • Biceps tendinitis of left upper extremity   • Subacromial impingement of left shoulder   • Status post arthroscopy of shoulder, rotator cuff repair with use of bio inductive implant, biceps tenodesis, extensive debridement including glenohumeral and subacromial space,  DOS 2021        Past Medical History:   Diagnosis Date   • Elevated cholesterol    • Stroke (CMS/HCC)     TIA   • TIA (transient ischemic attack)     3 years ago        Past Surgical History:   Procedure Laterality Date   • BASAL CELL CARCINOMA EXCISION     • KNEE ARTHROSCOPY W/ ACL RECONSTRUCTION     • SHOULDER ARTHROSCOPY W/ ROTATOR CUFF REPAIR Left 2021    Procedure: SHOULDER ARTHROSCOPY, rotator cuff repair with bio-inductive implant, open biceps tenodesis, extensive debridement;  Surgeon: Pete Zuñiga MD;  Location: [facility];  Service: Orthopedics;  Laterality: Left;                       PT Assessment/Plan     Row Name 21 1300          PT Assessment    Assessment Comments  Pt is showing increased shoulder range with his stretches and he tolerated his exercise progression.  -GC        PT Plan    PT Plan Comments  Pt is to continue his HEP daily.  -GC       User Key  (r) = Recorded By, (t) = Taken By, (c) = Cosigned By    Initials Name Provider Type    GC Luis Fernando Jennings, PT Physical Therapist          Modalities     Row Name 21 1300             Moist Heat    MH Applied  Yes  -GC      Location  left shoulder with pt sitting  -GC      Rx  Minutes  10 mins  -GC      MH Prior to Rx  Yes  -GC        User Key  (r) = Recorded By, (t) = Taken By, (c) = Cosigned By    Initials Name Provider Type    GC Luis Fernando Jennings, NELLY Physical Therapist        OP Exercises     Row Name 03/08/21 1300             Subjective Comments    Subjective Comments  Pt states his shoulder is feeling a little sore, but he did wash his cars yesterday.  -GC         Exercise 1    Exercise Name 1  Cane stretch-FLEX  -GC      Cueing 1  Verbal;Demo  -GC      Reps 1  15  -GC      Time 1  5 secs  -GC         Exercise 2    Exercise Name 2  Cane stretch-ABD  -GC      Cueing 2  Verbal;Demo  -GC      Reps 2  15  -GC      Time 2  5 secs  -GC         Exercise 3    Exercise Name 3  Cane stretch-ER  -GC      Cueing 3  Verbal;Demo  -GC      Reps 3  15  -GC      Time 3  5 secs  -GC         Exercise 4    Exercise Name 4  Pulley-FLEX  -GC      Cueing 4  Verbal;Demo  -GC      Time 4  4 min  -GC         Exercise 5    Exercise Name 5  Pulley-Scaption  -GC      Cueing 5  Verbal;Demo  -GC      Time 5  4 min  -GC         Exercise 6    Exercise Name 6  Shoulder ER vs theraband  -GC      Cueing 6  Verbal;Demo  -GC      Reps 6  25  -GC      Time 6  green  -GC         Exercise 7    Exercise Name 7  Shoulder IR vs theraband  -GC      Cueing 7  Verbal;Demo  -GC      Reps 7  25  -GC      Time 7  green  -GC         Exercise 8    Exercise Name 8  Shoulder Rows vs theraband  -GC      Cueing 8  Verbal;Demo  -GC      Reps 8  25  -GC      Time 8  green  -GC         Exercise 9    Exercise Name 9  Shoulder EXT vs theraband  -GC      Cueing 9  Verbal;Demo  -GC      Reps 9  25  -GC      Time 9  green  -GC         Exercise 10    Exercise Name 10  sidelying shoulder ER  -GC      Cueing 10  Verbal;Demo  -GC      Reps 10  30  -GC      Time 10  1 lb  -GC         Exercise 11    Exercise Name 11  scaption thumb up  -GC      Cueing 11  Verbal;Demo  -GC      Reps 11  20  -GC      Time 11  1 lb  -GC         Exercise 12    Exercise Name  12  scaption thumb down  -GC      Cueing 12  Verbal;Demo  -GC      Reps 12  20  -GC      Time 12  1 lb  -GC        User Key  (r) = Recorded By, (t) = Taken By, (c) = Cosigned By    Initials Name Provider Type     Luis Fernando Jennings, PT Physical Therapist                      Manual Rx (last 36 hours)      Manual Treatments     Row Name 03/08/21 1300             Manual Rx 1    Manual Rx 1 Location  PROM left shoulder for flexion/scaption/ER/IR  -GC      Manual Rx 1 Type  patient supine  -GC      Manual Rx 1 Duration  15 min  -GC        User Key  (r) = Recorded By, (t) = Taken By, (c) = Cosigned By    Initials Name Provider Type     Luis Fernando Jennings, PT Physical Therapist                             Time Calculation:   Start Time: 1300  Stop Time: 1357  Time Calculation (min): 57 min  Therapy Charges for Today     Code Description Service Date Service Provider Modifiers Qty    [id number]  PT THER PROC EA 15 MIN 3/8/2021 Luis Fernando Jennings, PT GP 1    [id number]  PT MANUAL THERAPY EA 15 MIN 3/8/2021 Luis Fernando Jennings, PT GP 1                    Luis Fernando Jennings PT  3/8/2021

## 2021-03-10 ENCOUNTER — HOSPITAL ENCOUNTER (OUTPATIENT)
Dept: PHYSICAL THERAPY | Facility: HOSPITAL | Age: 46
Setting detail: THERAPIES SERIES
Discharge: HOME | End: 2021-03-10

## 2021-03-10 DIAGNOSIS — Z98.890 STATUS POST ARTHROSCOPY OF LEFT SHOULDER: Primary | ICD-10-CM

## 2021-03-10 PROCEDURE — 97110 THERAPEUTIC EXERCISES: CPT | Performed by: PHYSICAL THERAPIST

## 2021-03-10 PROCEDURE — 97140 MANUAL THERAPY 1/> REGIONS: CPT | Performed by: PHYSICAL THERAPIST

## 2021-03-10 NOTE — THERAPY TREATMENT NOTE
Outpatient Physical Therapy Ortho Treatment Note   Veronica Littlejohn     Patient Name: Jose Barber  : 1975  MRN: [id number]  Today's Date: 3/10/2021      Visit Date: 03/10/2021    Visit Dx:    ICD-10-CM ICD-9-CM   1. Status post arthroscopy of left shoulder  Z98.890 V45.89       Patient Active Problem List   Diagnosis   • S/P ACL repair   • TIA due to embolism (CMS/HCC)   • Vertebral artery dissection (CMS/HCC)   • Mixed hyperlipidemia   • Complete tear of left rotator cuff   • Biceps tendinitis of left upper extremity   • Subacromial impingement of left shoulder   • Status post arthroscopy of shoulder, rotator cuff repair with use of bio inductive implant, biceps tenodesis, extensive debridement including glenohumeral and subacromial space,  DOS 2021        Past Medical History:   Diagnosis Date   • Elevated cholesterol    • Stroke (CMS/HCC)     TIA   • TIA (transient ischemic attack)     3 years ago        Past Surgical History:   Procedure Laterality Date   • BASAL CELL CARCINOMA EXCISION     • KNEE ARTHROSCOPY W/ ACL RECONSTRUCTION     • SHOULDER ARTHROSCOPY W/ ROTATOR CUFF REPAIR Left 2021    Procedure: SHOULDER ARTHROSCOPY, rotator cuff repair with bio-inductive implant, open biceps tenodesis, extensive debridement;  Surgeon: Pete Zuñiga MD;  Location: [facility];  Service: Orthopedics;  Laterality: Left;       PT Ortho     Row Name 03/10/21 1240       Subjective Comments    Subjective Comments  Pt states his shoulder is doing well.  -      User Key  (r) = Recorded By, (t) = Taken By, (c) = Cosigned By    Initials Name Provider Type     Luis Fernando Jennings, PT Physical Therapist                      PT Assessment/Plan     Row Name 03/10/21 1240          PT Assessment    Assessment Comments  Pt is doing well with good tolerance his exercise progression.  -        PT Plan    PT Plan Comments  Pt is to continue his HEP 2x daily.  -       User Key  (r) = Recorded By, (t) = Taken By,  (c) = Cosigned By    Initials Name Provider Type    GC Luis Fernando Jennings, PT Physical Therapist          Modalities     Row Name 03/10/21 1240             Moist Heat    MH Applied  Yes  -GC      Location  left shoulder with pt sitting  -GC      Rx Minutes  10 mins  -GC      MH Prior to Rx  Yes  -GC        User Key  (r) = Recorded By, (t) = Taken By, (c) = Cosigned By    Initials Name Provider Type    GC Luis Fernando Jennings, PT Physical Therapist        OP Exercises     Row Name 03/10/21 1240             Subjective Comments    Subjective Comments  Pt states his shoulder is doing well.  -GC         Exercise 1    Exercise Name 1  Cane stretch-FLEX  -GC      Cueing 1  Verbal;Demo  -GC      Reps 1  15  -GC      Time 1  5 secs  -GC         Exercise 2    Exercise Name 2  Cane stretch-ABD  -GC      Cueing 2  Verbal;Demo  -GC      Reps 2  15  -GC      Time 2  5 secs  -GC         Exercise 3    Exercise Name 3  Cane stretch-ER  -GC      Cueing 3  Verbal;Demo  -GC      Reps 3  15  -GC      Time 3  5 secs  -GC         Exercise 4    Exercise Name 4  Pulley-FLEX  -GC      Cueing 4  Verbal;Demo  -GC      Time 4  4 min  -GC         Exercise 5    Exercise Name 5  Pulley-Scaption  -GC      Cueing 5  Verbal;Demo  -GC      Time 5  4 min  -GC         Exercise 6    Exercise Name 6  Shoulder ER vs theraband  -GC      Cueing 6  Verbal;Demo  -GC      Reps 6  25  -GC      Time 6  black  -GC         Exercise 7    Exercise Name 7  Shoulder IR vs theraband  -GC      Cueing 7  Verbal;Demo  -GC      Reps 7  25  -GC      Time 7  black  -GC         Exercise 8    Exercise Name 8  Shoulder Rows vs theraband  -GC      Cueing 8  Verbal;Demo  -GC      Reps 8  25  -GC      Time 8  black  -GC         Exercise 9    Exercise Name 9  Shoulder EXT vs theraband  -GC      Cueing 9  Verbal;Demo  -GC      Reps 9  25  -GC      Time 9  black  -GC         Exercise 10    Exercise Name 10  sidelying shoulder ER  -GC      Cueing 10  Verbal;Demo  -GC      Reps 10  30  -GC       Time 10  1 lb  -GC         Exercise 11    Exercise Name 11  scaption thumb up  -GC      Cueing 11  Verbal;Demo  -GC      Reps 11  25  -GC      Time 11  1 lb  -GC         Exercise 12    Exercise Name 12  scaption thumb down  -GC      Cueing 12  Verbal;Demo  -GC      Reps 12  25  -GC      Time 12  1 lb  -GC         Exercise 13    Exercise Name 13  Prone Hor ABD 90  -GC      Cueing 13  Verbal;Tactile  -GC      Reps 13  25  -GC      Time 13  1#  -GC         Exercise 14    Exercise Name 14  Prone Hor   -GC      Cueing 14  Verbal;Tactile  -GC      Reps 14  25  -GC      Time 14  1#  -GC        User Key  (r) = Recorded By, (t) = Taken By, (c) = Cosigned By    Initials Name Provider Type     Luis Fernando Jennings PT Physical Therapist                      Manual Rx (last 36 hours)      Manual Treatments     Row Name 03/10/21 1240             Manual Rx 1    Manual Rx 1 Location  PROM left shoulder for flexion/scaption/ER/IR  -GC      Manual Rx 1 Type  patient supine  -GC      Manual Rx 1 Duration  15 min  -GC        User Key  (r) = Recorded By, (t) = Taken By, (c) = Cosigned By    Initials Name Provider Type     Luis Fernando Jennings, PT Physical Therapist                             Time Calculation:   Start Time: 1240  Stop Time: 1342  Time Calculation (min): 62 min  Therapy Charges for Today     Code Description Service Date Service Provider Modifiers Qty    [id number]  PT THER PROC EA 15 MIN 3/10/2021 Luis Fernando Jennings, PT GP 1    [id number]  PT MANUAL THERAPY EA 15 MIN 3/10/2021 Luis Fernando Jennings, PT GP 1                    Luis Fernando Jennings PT  3/10/2021

## 2021-03-16 ENCOUNTER — OFFICE VISIT (OUTPATIENT)
Dept: ORTHOPEDIC SURGERY | Facility: CLINIC | Age: 46
End: 2021-03-16

## 2021-03-16 ENCOUNTER — HOSPITAL ENCOUNTER (OUTPATIENT)
Dept: PHYSICAL THERAPY | Facility: HOSPITAL | Age: 46
Setting detail: THERAPIES SERIES
Discharge: HOME | End: 2021-03-16

## 2021-03-16 VITALS — HEIGHT: 69 IN | WEIGHT: 188 LBS | BODY MASS INDEX: 27.85 KG/M2

## 2021-03-16 DIAGNOSIS — Z98.890 STATUS POST ARTHROSCOPY OF SHOULDER: Primary | ICD-10-CM

## 2021-03-16 DIAGNOSIS — M75.122 COMPLETE TEAR OF LEFT ROTATOR CUFF, UNSPECIFIED WHETHER TRAUMATIC: ICD-10-CM

## 2021-03-16 DIAGNOSIS — M75.22 BICEPS TENDINITIS OF LEFT UPPER EXTREMITY: ICD-10-CM

## 2021-03-16 DIAGNOSIS — Z98.890 STATUS POST ARTHROSCOPY OF LEFT SHOULDER: Primary | ICD-10-CM

## 2021-03-16 PROCEDURE — 99024 POSTOP FOLLOW-UP VISIT: CPT | Performed by: ORTHOPAEDIC SURGERY

## 2021-03-16 PROCEDURE — 97140 MANUAL THERAPY 1/> REGIONS: CPT | Performed by: PHYSICAL THERAPIST

## 2021-03-16 PROCEDURE — 97110 THERAPEUTIC EXERCISES: CPT | Performed by: PHYSICAL THERAPIST

## 2021-03-16 NOTE — PROGRESS NOTES
CC: F/u s/p left shoulder rotator cuff repair and biceps tenodesis  DOS 1/20/2021    Interval History: Patient returns to clinic stating that overall he is making good progress, continue to work with physical therapy, he has noted good improvement in his motion and slow but steady improvement in his strength.  He does have some mild intermittent night pain but that is improving, he has been able to lay on his left shoulder as well with much improved tolerance.  No fevers, chills, or sweats, and no drainage from incisions noted. He has continued physical therapy 2x/week.    Exam:   Left shoulder- incisions well healed   Active forward flex 155 degrees with 4- out of 5 strength, passive forward flexion 170 degrees  Active external rotation 35 degrees, passive 45 degrees, 4- out of 5 strength  Internal rotation L4, 4+ out of 5 strength on belly press test   Positive sensation all distributions left hand and proximal lateral aspect arm, positive deltoid firing   Cap refill < 3 seconds, radial pulse 2+   Positive deltoid firing   Flex/extend fingers/thumb/wrist with 4+/5 strength, positive thumbs up, okay sign, cross finger adduction and abduction against resistance     Impression: s/p left shoulder rotator cuff repair and biceps tenodesis    Plan:  1. Continue PT for work on ROM and progressing into strengthening per protocol.  2. Continue working with therapy bands for strengthening as well as flexibility exercises at home, intermittent anti-inflammatory medications as needed for any residual inflammation or pain.  We did discuss option for oral steroid taper if he plateaus in regards to progress with his motion.  3. Follow-up in 6 weeks with reevaluation of motion and strength.  All questions answered today.  Patient is happy with his progress.    No orders of the defined types were placed in this encounter.      No orders of the defined types were placed in this encounter.

## 2021-03-16 NOTE — THERAPY RE-EVALUATION
Outpatient Physical Therapy Ortho Re-Evaluation   Veronica Littlejohn     Patient Name: Jose Barber  : 1975  MRN: [id number]  Today's Date: 3/16/2021      Visit Date: 2021    Patient Active Problem List   Diagnosis   • S/P ACL repair   • TIA due to embolism (CMS/HCC)   • Vertebral artery dissection (CMS/HCC)   • Mixed hyperlipidemia   • Complete tear of left rotator cuff   • Biceps tendinitis of left upper extremity   • Subacromial impingement of left shoulder   • Status post arthroscopy of shoulder, rotator cuff repair with use of bio inductive implant, biceps tenodesis, extensive debridement including glenohumeral and subacromial space,  DOS 2021        Past Medical History:   Diagnosis Date   • Elevated cholesterol    • Stroke (CMS/HCC)     TIA   • TIA (transient ischemic attack)     3 years ago        Past Surgical History:   Procedure Laterality Date   • BASAL CELL CARCINOMA EXCISION     • KNEE ARTHROSCOPY W/ ACL RECONSTRUCTION     • SHOULDER ARTHROSCOPY W/ ROTATOR CUFF REPAIR Left 2021    Procedure: SHOULDER ARTHROSCOPY, rotator cuff repair with bio-inductive implant, open biceps tenodesis, extensive debridement;  Surgeon: Pete Zuñiga MD;  Location: [facility];  Service: Orthopedics;  Laterality: Left;       Visit Dx:     ICD-10-CM ICD-9-CM   1. Status post arthroscopy of left shoulder  Z98.890 V45.89             PT Ortho     Row Name 21 1320       Subjective Comments    Subjective Comments  Pt states his shoulder is feeling pretty good and he denies any rel pain with ADL function.  -GC       Left Upper Ext    Lt Shoulder Abduction AROM  147 degrees  -GC    Lt Shoulder Flexion AROM  150 degrees  -GC    Lt Shoulder External Rotation AROM  52 degrees  -GC    Lt Shoulder Internal Rotation AROM  66 degrees  -GC       MMT Left Upper Ext    Lt Shoulder Flexion MMT, Gross Movement  (4+/5) good plus  -GC    Lt Shoulder Extension MMT, Gross Movement  (4+/5) good plus  -GC    Lt  Shoulder ABduction MMT, Gross Movement  (4/5) good  -    Lt Shoulder Internal Rotation MMT, Gross Movement  (4+/5) good plus  -    Lt Shoulder External Rotation MMT, Gross Movement  (3+/5) fair plus  -    Lt Upper Extremity Comments   scaption strength is 3+/5  -      User Key  (r) = Recorded By, (t) = Taken By, (c) = Cosigned By    Initials Name Provider Type    GC Luis Fernando Jennings, PT Physical Therapist                            PT OP Goals     Row Name 03/16/21 1320          PT Short Term Goals    STG Date to Achieve  02/24/21  -     STG 1  Patient to verbally report decreased left shoulder pain to 1-2/10.  -     STG 1 Progress  Met  -     STG 2  Patient to have improved left shoulder PROM to 150 degrees flexion, 170 degrees scaption, 50 degrees IR & ER.   -     STG 2 Progress  Met  -     STG 3  Patient independent with initial HEP issued by therapist.   -     STG 3 Progress  Met  -        Long Term Goals    LTG Date to Achieve  03/10/21  -     LTG 1  Patient to verbally report decreased left shoulder pain to 0-1/10 with ADLs and everyday activities.  -     LTG 1 Progress  Met  -     LTG 2  Patient to have improved left shoulder PROM to 170 degrees flexion, 180 degrees scaption, 70 degrees IR & ER.   -     LTG 2 Progress  Ongoing;Progressing  -     LTG 3  Patient independent with more advanced HEP issued by therapist.  -     LTG 3 Progress  Met  -     LTG 4  Patient able to actively elevate left shoulder to 90 degrees to allow for improved functional use of left UE with ADLs.   -     LTG 4 Progress  Met  -     LTG 5  Left elbow AROM WNL and painfree.   -     LTG 5 Progress  Met  -       User Key  (r) = Recorded By, (t) = Taken By, (c) = Cosigned By    Initials Name Provider Type     Luis Fernando Jennings, PT Physical Therapist          PT Assessment/Plan     Row Name 03/16/21 1320          PT Assessment    Assessment Comments  Pt is doing well with increasing shoulder ROM and  strength.  -GC        PT Plan    PT Plan Comments  Pt has MD follow this afternoon. Will continue as advised.  -GC       User Key  (r) = Recorded By, (t) = Taken By, (c) = Cosigned By    Initials Name Provider Type    GC Luis Fernando Jennings, PT Physical Therapist          Modalities     Row Name 03/16/21 1320             Moist Heat    MH Applied  Yes  -GC      Location  left shoulder with pt sitting  -GC      Rx Minutes  10 mins  -GC      MH Prior to Rx  Yes  -GC        User Key  (r) = Recorded By, (t) = Taken By, (c) = Cosigned By    Initials Name Provider Type    GC Luis Fernando Jennings, PT Physical Therapist        OP Exercises     Row Name 03/16/21 1320             Subjective Comments    Subjective Comments  Pt states his shoulder is feeling pretty good and he denies any rel pain with ADL function.  -GC         Exercise 1    Exercise Name 1  Cane stretch-FLEX  -GC      Cueing 1  Verbal;Demo  -GC      Reps 1  15  -GC      Time 1  5 secs  -GC         Exercise 2    Exercise Name 2  Cane stretch-ABD  -GC      Cueing 2  Verbal;Demo  -GC      Reps 2  15  -GC      Time 2  5 secs  -GC         Exercise 3    Exercise Name 3  Cane stretch-ER  -GC      Cueing 3  Verbal;Demo  -GC      Reps 3  15  -GC      Time 3  5 secs  -GC         Exercise 4    Exercise Name 4  Pulley-FLEX  -GC      Cueing 4  Verbal;Demo  -GC      Time 4  4 min  -GC         Exercise 5    Exercise Name 5  Pulley-Scaption  -GC      Cueing 5  Verbal;Demo  -GC      Time 5  4 min  -GC         Exercise 6    Exercise Name 6  Shoulder ER vs theraband  -GC      Cueing 6  Verbal;Demo  -GC      Reps 6  25  -GC      Time 6  black  -GC         Exercise 7    Exercise Name 7  Shoulder IR vs theraband  -GC      Cueing 7  Verbal;Demo  -GC      Reps 7  25  -GC      Time 7  black  -GC         Exercise 8    Exercise Name 8  Shoulder Rows vs theraband  -GC      Cueing 8  Verbal;Demo  -GC      Reps 8  25  -GC      Time 8  black  -GC         Exercise 9    Exercise Name 9  Shoulder EXT vs  theraband  -GC      Cueing 9  Verbal;Demo  -GC      Reps 9  25  -GC      Time 9  black  -GC         Exercise 10    Exercise Name 10  sidelying shoulder ER  -GC      Cueing 10  Verbal;Demo  -GC      Reps 10  30  -GC      Time 10  3#  -GC         Exercise 11    Exercise Name 11  scaption thumb up  -GC      Cueing 11  Verbal;Demo  -GC      Reps 11  25  -GC      Time 11  2#  -GC         Exercise 12    Exercise Name 12  scaption thumb down  -GC      Cueing 12  Verbal;Demo  -GC      Reps 12  25  -GC      Time 12  2#  -GC         Exercise 13    Exercise Name 13  Prone Hor ABD 90  -GC      Cueing 13  Verbal;Tactile  -GC      Reps 13  25  -GC      Time 13  2#  -GC         Exercise 14    Exercise Name 14  Prone Hor   -GC      Cueing 14  Verbal;Tactile  -GC      Reps 14  25  -GC      Time 14  2#  -GC        User Key  (r) = Recorded By, (t) = Taken By, (c) = Cosigned By    Initials Name Provider Type     Luis Fernando Jennings, PT Physical Therapist           Manual Rx (last 36 hours)      Manual Treatments     Row Name 03/16/21 1320             Manual Rx 1    Manual Rx 1 Location  PROM left shoulder for flexion/scaption/ER/IR  -GC      Manual Rx 1 Type  patient supine  -GC      Manual Rx 1 Duration  15 min  -GC        User Key  (r) = Recorded By, (t) = Taken By, (c) = Cosigned By    Initials Name Provider Type     Luis Fernando Jennings, PT Physical Therapist                                Time Calculation:     Start Time: 1320  Stop Time: 1410  Time Calculation (min): 50 min     Therapy Charges for Today     Code Description Service Date Service Provider Modifiers Qty    24534976471  PT THER PROC EA 15 MIN 3/16/2021 Luis Fernando Jennings, PT GP 1    [id number]  PT MANUAL THERAPY EA 15 MIN 3/16/2021 Luis Fernando Jennings, PT GP 1                    Luis Fernando Jennings PT  3/16/2021

## 2021-03-19 ENCOUNTER — HOSPITAL ENCOUNTER (OUTPATIENT)
Dept: PHYSICAL THERAPY | Facility: HOSPITAL | Age: 46
Setting detail: THERAPIES SERIES
Discharge: HOME | End: 2021-03-19

## 2021-03-19 DIAGNOSIS — Z98.890 STATUS POST ARTHROSCOPY OF LEFT SHOULDER: Primary | ICD-10-CM

## 2021-03-19 PROCEDURE — 97140 MANUAL THERAPY 1/> REGIONS: CPT | Performed by: PHYSICAL THERAPIST

## 2021-03-19 PROCEDURE — 97110 THERAPEUTIC EXERCISES: CPT | Performed by: PHYSICAL THERAPIST

## 2021-03-19 NOTE — THERAPY TREATMENT NOTE
Outpatient Physical Therapy Ortho Treatment Note  MIRYAM Kovacs     Patient Name: Jose Barber  : 1975  MRN: [id number]  Today's Date: 3/19/2021      Visit Date: 2021    Visit Dx:    ICD-10-CM ICD-9-CM   1. Status post arthroscopy of left shoulder  Z98.890 V45.89       Patient Active Problem List   Diagnosis   • S/P ACL repair   • TIA due to embolism (CMS/HCC)   • Vertebral artery dissection (CMS/HCC)   • Mixed hyperlipidemia   • Complete tear of left rotator cuff   • Biceps tendinitis of left upper extremity   • Subacromial impingement of left shoulder   • Status post arthroscopy of shoulder, rotator cuff repair with use of bio inductive implant, biceps tenodesis, extensive debridement including glenohumeral and subacromial space,  DOS 2021        Past Medical History:   Diagnosis Date   • Elevated cholesterol    • Stroke (CMS/HCC)     TIA   • TIA (transient ischemic attack)     3 years ago        Past Surgical History:   Procedure Laterality Date   • BASAL CELL CARCINOMA EXCISION     • KNEE ARTHROSCOPY W/ ACL RECONSTRUCTION     • SHOULDER ARTHROSCOPY W/ ROTATOR CUFF REPAIR Left 2021    Procedure: SHOULDER ARTHROSCOPY, rotator cuff repair with bio-inductive implant, open biceps tenodesis, extensive debridement;  Surgeon: Pete Zuñiga MD;  Location: [facility];  Service: Orthopedics;  Laterality: Left;                       PT Assessment/Plan     Row Name 21 1300          PT Assessment    Assessment Comments  Pt is doing well with increasing shoulder ROM and improving strength.  -GC        PT Plan    PT Plan Comments  Pt is to continue his HEP 2x daily.  -GC       User Key  (r) = Recorded By, (t) = Taken By, (c) = Cosigned By    Initials Name Provider Type    GC Luis Fernando Jennings, PT Physical Therapist          Modalities     Row Name 21 1300             Moist Heat    MH Applied  Yes  -GC      Location  left shoulder with pt sitting  -GC      Rx Minutes  10 mins  -GC       MH Prior to Rx  Yes  -GC        User Key  (r) = Recorded By, (t) = Taken By, (c) = Cosigned By    Initials Name Provider Type    Luis Fernando Oconnor PT Physical Therapist        OP Exercises     Row Name 03/19/21 1300             Subjective Comments    Subjective Comments  Pt states his shoulder is feeling pretty good.  -GC         Exercise 1    Exercise Name 1  Cane stretch-FLEX  -GC      Cueing 1  Verbal;Demo  -GC      Reps 1  15  -GC      Time 1  5 secs  -GC         Exercise 2    Exercise Name 2  Cane stretch-ABD  -GC      Cueing 2  Verbal;Demo  -GC      Reps 2  15  -GC      Time 2  5 secs  -GC         Exercise 3    Exercise Name 3  Cane stretch-ER  -GC      Cueing 3  Verbal;Demo  -GC      Reps 3  15  -GC      Time 3  5 secs  -GC         Exercise 4    Exercise Name 4  Pulley-FLEX  -GC      Cueing 4  Verbal;Demo  -GC      Time 4  4 min  -GC         Exercise 5    Exercise Name 5  Pulley-Scaption  -GC      Cueing 5  Verbal;Demo  -GC      Time 5  4 min  -GC         Exercise 6    Exercise Name 6  Shoulder ER vs theraband  -GC      Cueing 6  Verbal;Demo  -GC      Reps 6  25  -GC      Time 6  silver  -GC         Exercise 7    Exercise Name 7  Shoulder IR vs theraband  -GC      Cueing 7  Verbal;Demo  -GC      Reps 7  25  -GC      Time 7  silver  -GC         Exercise 8    Exercise Name 8  Shoulder Rows vs theraband  -GC      Cueing 8  Verbal;Demo  -GC      Reps 8  25  -GC      Time 8  silver  -GC         Exercise 9    Exercise Name 9  Shoulder EXT vs theraband  -GC      Cueing 9  Verbal;Demo  -GC      Reps 9  25  -GC      Time 9  silver  -GC         Exercise 10    Exercise Name 10  sidelying shoulder ER  -GC      Cueing 10  Verbal;Demo  -GC      Reps 10  30  -GC      Time 10  4#  -GC         Exercise 11    Exercise Name 11  scaption thumb up  -GC      Cueing 11  Verbal;Demo  -GC      Reps 11  25  -GC      Time 11  2#  -GC         Exercise 12    Exercise Name 12  scaption thumb down  -GC      Cueing 12  Verbal;Demo  -GC       Reps 12  25  -GC      Time 12  2#  -GC         Exercise 13    Exercise Name 13  Prone Hor ABD 90  -GC      Cueing 13  Verbal;Tactile  -GC      Reps 13  25  -GC      Time 13  2#  -GC         Exercise 14    Exercise Name 14  Prone Hor   -GC      Cueing 14  Verbal;Tactile  -GC      Reps 14  25  -GC      Time 14  2#  -GC        User Key  (r) = Recorded By, (t) = Taken By, (c) = Cosigned By    Initials Name Provider Type     Luis Fernando Jennings, PT Physical Therapist                      Manual Rx (last 36 hours)      Manual Treatments     Row Name 03/19/21 1300             Manual Rx 1    Manual Rx 1 Location  PROM left shoulder for flexion/scaption/ER/IR  -GC      Manual Rx 1 Type  patient supine  -GC      Manual Rx 1 Duration  15 min  -GC        User Key  (r) = Recorded By, (t) = Taken By, (c) = Cosigned By    Initials Name Provider Type     Luis Fernando Jennings, PT Physical Therapist                             Time Calculation:   Start Time: 1300  Stop Time: 1403  Time Calculation (min): 63 min  Therapy Charges for Today     Code Description Service Date Service Provider Modifiers Qty    15345428082  PT THER PROC EA 15 MIN 3/19/2021 Luis Fernando Jennings, PT GP 1    [id number] HC PT MANUAL THERAPY EA 15 MIN 3/19/2021 Luis Fernando Jennings, PT GP 1                    Luis Fernando Jennings PT  3/19/2021

## 2021-03-22 ENCOUNTER — HOSPITAL ENCOUNTER (OUTPATIENT)
Dept: PHYSICAL THERAPY | Facility: HOSPITAL | Age: 46
Setting detail: THERAPIES SERIES
Discharge: HOME | End: 2021-03-22

## 2021-03-22 DIAGNOSIS — Z98.890 STATUS POST ARTHROSCOPY OF LEFT SHOULDER: Primary | ICD-10-CM

## 2021-03-22 DIAGNOSIS — M75.42 SUBACROMIAL IMPINGEMENT OF LEFT SHOULDER: ICD-10-CM

## 2021-03-22 DIAGNOSIS — M75.122 COMPLETE TEAR OF LEFT ROTATOR CUFF, UNSPECIFIED WHETHER TRAUMATIC: ICD-10-CM

## 2021-03-22 DIAGNOSIS — M75.22 BICIPITAL TENDINITIS OF LEFT SHOULDER: ICD-10-CM

## 2021-03-22 PROCEDURE — 97110 THERAPEUTIC EXERCISES: CPT | Performed by: PHYSICAL THERAPIST

## 2021-03-22 PROCEDURE — 97140 MANUAL THERAPY 1/> REGIONS: CPT | Performed by: PHYSICAL THERAPIST

## 2021-03-22 NOTE — THERAPY TREATMENT NOTE
Outpatient Physical Therapy Ortho Treatment Note   Veronica Littlejohn     Patient Name: Jose Barber  : 1975  MRN: [id number]  Today's Date: 3/22/2021      Visit Date: 2021    Visit Dx:    ICD-10-CM ICD-9-CM   1. Status post arthroscopy of left shoulder  Z98.890 V45.89   2. Complete tear of left rotator cuff, unspecified whether traumatic  M75.122 727.61   3. Bicipital tendinitis of left shoulder  M75.22 726.12   4. Subacromial impingement of left shoulder  M75.42 726.19       Patient Active Problem List   Diagnosis   • S/P ACL repair   • TIA due to embolism (CMS/HCC)   • Vertebral artery dissection (CMS/HCC)   • Mixed hyperlipidemia   • Complete tear of left rotator cuff   • Biceps tendinitis of left upper extremity   • Subacromial impingement of left shoulder   • Status post arthroscopy of shoulder, rotator cuff repair with use of bio inductive implant, biceps tenodesis, extensive debridement including glenohumeral and subacromial space,  DOS 2021        Past Medical History:   Diagnosis Date   • Elevated cholesterol    • Stroke (CMS/HCC)     TIA   • TIA (transient ischemic attack)     3 years ago        Past Surgical History:   Procedure Laterality Date   • BASAL CELL CARCINOMA EXCISION     • KNEE ARTHROSCOPY W/ ACL RECONSTRUCTION     • SHOULDER ARTHROSCOPY W/ ROTATOR CUFF REPAIR Left 2021    Procedure: SHOULDER ARTHROSCOPY, rotator cuff repair with bio-inductive implant, open biceps tenodesis, extensive debridement;  Surgeon: Pete Zuñiga MD;  Location: [facility];  Service: Orthopedics;  Laterality: Left;       PT Ortho     Row Name 21 1300       Subjective Comments    Subjective Comments  Patient reports that he is not having any pain in left shoulder today.  He reports that upon waking, his shoulder feels tight but he uses a golf club to stretch and feels this helps.    -SP      User Key  (r) = Recorded By, (t) = Taken By, (c) = Cosigned By    Initials Name Provider Type     Hansa Groves, PT Physical Therapist                      PT Assessment/Plan     Row Name 03/22/21 1445          PT Assessment    Assessment Comments  Patient demonstrates improving left shoulder mobility and strength.  He is progressing well per protocol  -SP        PT Plan    PT Plan Comments  Continue to progress per protocol  -SP       User Key  (r) = Recorded By, (t) = Taken By, (c) = Cosigned By    Initials Name Provider Type    Hansa Groves, PT Physical Therapist          Modalities     Row Name 03/22/21 1300             Moist Heat    MH Applied  Yes  -SP      Location  left shoulder with pt sitting  -SP      Rx Minutes  10 mins  -SP      MH Prior to Rx  Yes  -SP        User Key  (r) = Recorded By, (t) = Taken By, (c) = Cosigned By    Initials Name Provider Type    Hansa Groves, PT Physical Therapist        OP Exercises     Row Name 03/22/21 1400 03/22/21 1300          Subjective Comments    Subjective Comments  --  -SP  Patient reports that he is not having any pain in left shoulder today.  He reports that upon waking, his shoulder feels tight but he uses a golf club to stretch and feels this helps.    -SP        Exercise 1    Exercise Name 1  --  Cane stretch-FLEX  -SP     Cueing 1  --  Verbal;Demo  -SP     Reps 1  --  15  -SP     Time 1  --  5 secs  -SP        Exercise 2    Exercise Name 2  --  Cane stretch-ABD  -SP     Cueing 2  --  Verbal;Demo  -SP     Reps 2  --  15  -SP     Time 2  --  5 secs  -SP        Exercise 3    Exercise Name 3  --  Cane stretch-ER  -SP     Cueing 3  --  Verbal;Demo  -SP     Reps 3  --  15  -SP     Time 3  --  5 secs  -SP        Exercise 4    Exercise Name 4  --  Pulley-FLEX  -SP     Cueing 4  --  Verbal;Demo  -SP     Time 4  --  4 min  -SP        Exercise 5    Exercise Name 5  --  Pulley-Scaption  -SP     Cueing 5  --  Verbal;Demo  -SP     Time 5  --  4 min  -SP        Exercise 6    Exercise Name 6  --  Shoulder ER vs theraband  -SP      Cueing 6  --  Verbal;Demo  -SP     Reps 6  --  25  -SP     Time 6  --  silver  -SP        Exercise 7    Exercise Name 7  --  Shoulder IR vs theraband  -SP     Cueing 7  --  Verbal;Demo  -SP     Reps 7  --  25  -SP     Time 7  --  silver  -SP        Exercise 8    Exercise Name 8  --  Shoulder Rows vs theraband  -SP     Cueing 8  --  Verbal;Demo  -SP     Reps 8  --  25  -SP     Time 8  --  silver  -SP        Exercise 9    Exercise Name 9  --  Shoulder EXT vs theraband  -SP     Cueing 9  --  Verbal;Demo  -SP     Reps 9  --  25  -SP     Time 9  --  silver  -SP        Exercise 10    Exercise Name 10  --  sidelying shoulder ER  -SP     Cueing 10  --  Verbal;Demo  -SP     Reps 10  --  30  -SP     Time 10  --  4#  -SP        Exercise 11    Exercise Name 11  --  scaption thumb up  -SP     Cueing 11  --  Verbal;Demo  -SP     Reps 11  --  25  -SP     Time 11  --  2#  -SP        Exercise 12    Exercise Name 12  --  scaption thumb down  -SP     Cueing 12  --  Verbal;Demo  -SP     Reps 12  --  25  -SP     Time 12  --  2#  -SP        Exercise 13    Exercise Name 13  --  Prone Hor ABD 90  -SP     Cueing 13  --  Verbal;Tactile  -SP     Reps 13  --  25  -SP     Time 13  --  2#  -SP        Exercise 14    Exercise Name 14  --  Prone Hor   -SP     Cueing 14  --  Verbal;Tactile  -SP     Reps 14  --  25  -SP     Time 14  --  2#  -SP        Exercise 15    Exercise Name 15  --  wall walk  -SP     Cueing 15  --  Verbal;Demo  -SP     Reps 15  --  10  -SP     Time 15  --  5-10 sec  -SP       User Key  (r) = Recorded By, (t) = Taken By, (c) = Cosigned By    Initials Name Provider Type    Hansa Groves PT Physical Therapist                      Manual Rx (last 36 hours)      Manual Treatments     Row Name 03/22/21 1300             Manual Rx 1    Manual Rx 1 Location  PROM left shoulder for flexion/scaption/ER/IR  -SP      Manual Rx 1 Type  patient supine  -SP      Manual Rx 1 Duration  15 min  -SP        User Key  (r) =  Recorded By, (t) = Taken By, (c) = Cosigned By    Initials Name Provider Type    SP Hansa Eisenberg, PT Physical Therapist              Therapy Education  Given: HEP  Program: Reinforced  How Provided: Verbal  Provided to: Patient  Level of Understanding: Verbalized, Demonstrated              Time Calculation:   Start Time: 1254  Stop Time: 1355  Time Calculation (min): 61 min  Therapy Charges for Today     Code Description Service Date Service Provider Modifiers Qty    40753030118  PT THER PROC EA 15 MIN 3/22/2021 Hansa Eisenberg, PT GP 1    [id number]  PT MANUAL THERAPY EA 15 MIN 3/22/2021 Hansa Eisenberg, PT GP 1                    Hansa Eisenberg, PT  3/22/2021

## 2021-03-25 ENCOUNTER — APPOINTMENT (OUTPATIENT)
Dept: PHYSICAL THERAPY | Facility: HOSPITAL | Age: 46
End: 2021-03-25

## 2021-04-02 ENCOUNTER — BULK ORDERING (OUTPATIENT)
Dept: CASE MANAGEMENT | Facility: OTHER | Age: 46
End: 2021-04-02

## 2021-04-02 DIAGNOSIS — Z23 IMMUNIZATION DUE: ICD-10-CM

## 2021-04-12 ENCOUNTER — HOSPITAL ENCOUNTER (OUTPATIENT)
Dept: PHYSICAL THERAPY | Facility: HOSPITAL | Age: 46
Setting detail: THERAPIES SERIES
Discharge: HOME | End: 2021-04-12

## 2021-04-12 DIAGNOSIS — Z98.890 STATUS POST ARTHROSCOPY OF LEFT SHOULDER: Primary | ICD-10-CM

## 2021-04-12 PROCEDURE — 97110 THERAPEUTIC EXERCISES: CPT | Performed by: PHYSICAL THERAPIST

## 2021-04-12 PROCEDURE — 97140 MANUAL THERAPY 1/> REGIONS: CPT | Performed by: PHYSICAL THERAPIST

## 2021-04-12 NOTE — THERAPY TREATMENT NOTE
Outpatient Physical Therapy Ortho Treatment Note   Veronica Littlejohn     Patient Name: Jose Barber  : 1975  MRN: [id number]  Today's Date: 2021      Visit Date: 2021    Visit Dx:    ICD-10-CM ICD-9-CM   1. Status post arthroscopy of left shoulder  Z98.890 V45.89       Patient Active Problem List   Diagnosis   • S/P ACL repair   • TIA due to embolism (CMS/HCC)   • Vertebral artery dissection (CMS/HCC)   • Mixed hyperlipidemia   • Complete tear of left rotator cuff   • Biceps tendinitis of left upper extremity   • Subacromial impingement of left shoulder   • Status post arthroscopy of shoulder, rotator cuff repair with use of bio inductive implant, biceps tenodesis, extensive debridement including glenohumeral and subacromial space,  DOS 2021        Past Medical History:   Diagnosis Date   • Elevated cholesterol    • Stroke (CMS/HCC)     TIA   • TIA (transient ischemic attack)     3 years ago        Past Surgical History:   Procedure Laterality Date   • BASAL CELL CARCINOMA EXCISION     • KNEE ARTHROSCOPY W/ ACL RECONSTRUCTION     • SHOULDER ARTHROSCOPY W/ ROTATOR CUFF REPAIR Left 2021    Procedure: SHOULDER ARTHROSCOPY, rotator cuff repair with bio-inductive implant, open biceps tenodesis, extensive debridement;  Surgeon: Pete Zuñiga MD;  Location: [facility];  Service: Orthopedics;  Laterality: Left;       PT Ortho     Row Name 21 1135       Shoulder Girdle Palpation    Greater Tubercule  Left:;Tender  -GC       Left Upper Ext    Lt Shoulder Abduction AROM  148 degrees  -GC    Lt Shoulder Flexion AROM  156 degrees  -GC    Lt Shoulder External Rotation AROM  67 degrees  -GC    Lt Shoulder Internal Rotation AROM  70 degrees  -GC       MMT Left Upper Ext    Lt Shoulder External Rotation MMT, Gross Movement  (3+/5) fair plus  -GC    Lt Upper Extremity Comments   scaption strength is 3+/5  -GC      User Key  (r) = Recorded By, (t) = Taken By, (c) = Cosigned By    Initials Name  Provider Type    Luis Fernando Oconnor PT Physical Therapist                      PT Assessment/Plan     Row Name 04/12/21 1131          PT Assessment    Assessment Comments  Pt seems to have strained his rotator cuff with the reported incident, but his ROM remains good and his strength is not negatively effected except for his ER. At this time, feel his symptoms are more indicative of a strain and not a tear.  -GC        PT Plan    PT Plan Comments  Pt is to continue his HEP daily. will re-ck again later this week.  -GC       User Key  (r) = Recorded By, (t) = Taken By, (c) = Cosigned By    Initials Name Provider Type    Luis Fernando Oconnor, PT Physical Therapist          Modalities     Row Name 04/12/21 1135             Moist Heat    MH Applied  Yes  -GC      Location  left shoulder with pt sitting  -GC      Rx Minutes  10 mins  -GC      MH Prior to Rx  Yes  -GC        User Key  (r) = Recorded By, (t) = Taken By, (c) = Cosigned By    Initials Name Provider Type    Luis Fernando Oconnor, PT Physical Therapist        OP Exercises     Row Name 04/12/21 1131             Subjective Comments    Subjective Comments  Pt states that a week ago he slipped on some steps while in socks and he caught himself with his left UE on the frame of the door. He has had a harder time with his ER exercises since and has more discomfort in his shoulder since.  -GC         Exercise 1    Exercise Name 1  Cane stretch-FLEX  -GC      Cueing 1  Verbal;Demo  -GC      Reps 1  15  -GC      Time 1  5 secs  -GC         Exercise 2    Exercise Name 2  Cane stretch-ABD  -GC      Cueing 2  Verbal;Demo  -GC      Reps 2  15  -GC      Time 2  5 secs  -GC         Exercise 3    Exercise Name 3  Cane stretch-ER  -GC      Cueing 3  Verbal;Demo  -GC      Reps 3  15  -GC      Time 3  5 secs  -GC         Exercise 4    Exercise Name 4  Pulley-FLEX  -GC      Cueing 4  Verbal;Demo  -GC      Time 4  4 min  -GC         Exercise 5    Exercise Name 5  Pulley-Scaption  -GC       Cueing 5  Verbal;Demo  -GC      Time 5  4 min  -GC         Exercise 6    Exercise Name 6  Shoulder ER vs theraband  -GC      Cueing 6  Verbal;Demo  -GC      Reps 6  25  -GC      Time 6  blue  -GC         Exercise 7    Exercise Name 7  Shoulder IR vs theraband  -GC      Cueing 7  Verbal;Demo  -GC      Reps 7  25  -GC      Time 7  silver  -GC         Exercise 8    Exercise Name 8  Shoulder Rows vs theraband  -GC      Cueing 8  Verbal;Demo  -GC      Reps 8  25  -GC      Time 8  silver  -GC         Exercise 9    Exercise Name 9  Shoulder EXT vs theraband  -GC      Cueing 9  Verbal;Demo  -GC      Reps 9  25  -GC      Time 9  silver  -GC         Exercise 10    Exercise Name 10  sidelying shoulder ER  -GC      Cueing 10  Verbal;Demo  -GC      Reps 10  30  -GC      Time 10  4#  -GC         Exercise 11    Exercise Name 11  scaption thumb up  -GC      Cueing 11  Verbal;Demo  -GC      Reps 11  25  -GC      Time 11  3#  -GC         Exercise 12    Exercise Name 12  scaption thumb down  -GC      Cueing 12  Verbal;Demo  -GC      Reps 12  25  -GC      Time 12  3#  -GC         Exercise 13    Exercise Name 13  Prone Hor ABD 90  -GC      Cueing 13  Verbal;Tactile  -GC      Reps 13  25  -GC      Time 13  4#  -GC         Exercise 14    Exercise Name 14  Prone Hor   -GC      Cueing 14  Verbal;Tactile  -GC      Reps 14  25  -GC      Time 14  4#  -GC         Exercise 15    Exercise Name 15  wall walk  -GC      Cueing 15  Verbal;Demo  -GC      Reps 15  10  -GC      Time 15  5-10 sec  -GC        User Key  (r) = Recorded By, (t) = Taken By, (c) = Cosigned By    Initials Name Provider Type    GC Luis Fernando Jennings PT Physical Therapist                      Manual Rx (last 36 hours)      Manual Treatments     Row Name 04/12/21 1135             Manual Rx 1    Manual Rx 1 Location  PROM left shoulder for flexion/scaption/ER/IR  -GC      Manual Rx 1 Type  patient supine  -GC      Manual Rx 1 Duration  15 min  -GC        User Key  (r) =  Recorded By, (t) = Taken By, (c) = Cosigned By    Initials Name Provider Type     Luis Fernando Jennings, PT Physical Therapist                             Time Calculation:   Start Time: 1135  Stop Time: 1216  Time Calculation (min): 41 min  Therapy Charges for Today     Code Description Service Date Service Provider Modifiers Qty    34673607523  PT THER PROC EA 15 MIN 4/12/2021 Luis Fernando Jennings, PT GP 1    [id number]  PT MANUAL THERAPY EA 15 MIN 4/12/2021 Luis Fernando Jennings, PT GP 1                    Luis Fernando Jennings PT  4/12/2021

## 2021-04-15 ENCOUNTER — APPOINTMENT (OUTPATIENT)
Dept: PHYSICAL THERAPY | Facility: HOSPITAL | Age: 46
End: 2021-04-15

## 2021-04-19 ENCOUNTER — HOSPITAL ENCOUNTER (OUTPATIENT)
Dept: PHYSICAL THERAPY | Facility: HOSPITAL | Age: 46
Setting detail: THERAPIES SERIES
Discharge: HOME | End: 2021-04-19

## 2021-04-19 DIAGNOSIS — Z98.890 STATUS POST ARTHROSCOPY OF LEFT SHOULDER: Primary | ICD-10-CM

## 2021-04-19 PROCEDURE — 97140 MANUAL THERAPY 1/> REGIONS: CPT | Performed by: PHYSICAL THERAPIST

## 2021-04-19 PROCEDURE — 97110 THERAPEUTIC EXERCISES: CPT | Performed by: PHYSICAL THERAPIST

## 2021-04-19 NOTE — THERAPY TREATMENT NOTE
Outpatient Physical Therapy Ortho Treatment Note   Veronica Littlejohn     Patient Name: Jose Barber  : 1975  MRN: [id number]  Today's Date: 2021      Visit Date: 2021    Visit Dx:    ICD-10-CM ICD-9-CM   1. Status post arthroscopy of left shoulder  Z98.890 V45.89       Patient Active Problem List   Diagnosis   • S/P ACL repair   • TIA due to embolism (CMS/HCC)   • Vertebral artery dissection (CMS/HCC)   • Mixed hyperlipidemia   • Complete tear of left rotator cuff   • Biceps tendinitis of left upper extremity   • Subacromial impingement of left shoulder   • Status post arthroscopy of shoulder, rotator cuff repair with use of bio inductive implant, biceps tenodesis, extensive debridement including glenohumeral and subacromial space,  DOS 2021        Past Medical History:   Diagnosis Date   • Elevated cholesterol    • Stroke (CMS/HCC)     TIA   • TIA (transient ischemic attack)     3 years ago        Past Surgical History:   Procedure Laterality Date   • BASAL CELL CARCINOMA EXCISION     • KNEE ARTHROSCOPY W/ ACL RECONSTRUCTION     • SHOULDER ARTHROSCOPY W/ ROTATOR CUFF REPAIR Left 2021    Procedure: SHOULDER ARTHROSCOPY, rotator cuff repair with bio-inductive implant, open biceps tenodesis, extensive debridement;  Surgeon: Pete Zuñiga MD;  Location: [facility];  Service: Orthopedics;  Laterality: Left;       PT Ortho     Row Name 21 1230       Subjective Comments    Subjective Comments  Pt states his shoulder is still just a little sore.  -      User Key  (r) = Recorded By, (t) = Taken By, (c) = Cosigned By    Initials Name Provider Type     Luis Fernando Jennings, PT Physical Therapist                      PT Assessment/Plan     Row Name 21 1230          PT Assessment    Assessment Comments  Pt is showing increased shoulder ROM and decreasing c/o pain.  -        PT Plan    PT Plan Comments  Pt is to continue his HEP daily.  -       User Key  (r) = Recorded By, (t) =  Taken By, (c) = Cosigned By    Initials Name Provider Type    GC Luis Fernando Jennings, PT Physical Therapist          Modalities     Row Name 04/19/21 1230             Moist Heat    MH Applied  Yes  -GC      Location  left shoulder with pt sitting  -GC      Rx Minutes  10 mins  -GC      MH Prior to Rx  Yes  -GC        User Key  (r) = Recorded By, (t) = Taken By, (c) = Cosigned By    Initials Name Provider Type    GC Luis Fernando Jennings, PT Physical Therapist        OP Exercises     Row Name 04/19/21 1230             Subjective Comments    Subjective Comments  Pt states his shoulder is still just a little sore.  -GC         Exercise 1    Exercise Name 1  Cane stretch-FLEX  -GC      Cueing 1  Verbal;Demo  -GC      Reps 1  15  -GC      Time 1  5 secs  -GC         Exercise 2    Exercise Name 2  Cane stretch-ABD  -GC      Cueing 2  Verbal;Demo  -GC      Reps 2  15  -GC      Time 2  5 secs  -GC         Exercise 3    Exercise Name 3  Cane stretch-ER  -GC      Cueing 3  Verbal;Demo  -GC      Reps 3  15  -GC      Time 3  5 secs  -GC         Exercise 4    Exercise Name 4  Pulley-FLEX  -GC      Cueing 4  Verbal;Demo  -GC      Time 4  4 min  -GC         Exercise 5    Exercise Name 5  Pulley-Scaption  -GC      Cueing 5  Verbal;Demo  -GC      Time 5  4 min  -GC         Exercise 6    Exercise Name 6  Shoulder ER vs theraband  -GC      Cueing 6  Verbal;Demo  -GC      Reps 6  25  -GC      Time 6  black  -GC         Exercise 7    Exercise Name 7  Shoulder IR vs theraband  -GC      Cueing 7  Verbal;Demo  -GC      Reps 7  25  -GC      Time 7  silver  -GC         Exercise 8    Exercise Name 8  Shoulder Rows vs theraband  -GC      Cueing 8  Verbal;Demo  -GC      Reps 8  25  -GC      Time 8  silver  -GC         Exercise 9    Exercise Name 9  Shoulder EXT vs theraband  -GC      Cueing 9  Verbal;Demo  -GC      Reps 9  25  -GC      Time 9  silver  -GC         Exercise 10    Exercise Name 10  sidelying shoulder ER  -GC      Cueing 10  Verbal;Demo  -GC       Reps 10  30  -GC      Time 10  4#  -GC         Exercise 11    Exercise Name 11  scaption thumb up  -GC      Cueing 11  Verbal;Demo  -GC      Reps 11  25  -GC      Time 11  3#  -GC         Exercise 12    Exercise Name 12  scaption thumb down  -GC      Cueing 12  Verbal;Demo  -GC      Reps 12  25  -GC      Time 12  3#  -GC         Exercise 13    Exercise Name 13  Prone Hor ABD 90  -GC      Cueing 13  Verbal;Tactile  -GC      Reps 13  25  -GC      Time 13  4#  -GC         Exercise 14    Exercise Name 14  Prone Hor   -GC      Cueing 14  Verbal;Tactile  -GC      Reps 14  25  -GC      Time 14  4#  -GC         Exercise 15    Exercise Name 15  wall walk  -GC      Cueing 15  Verbal;Demo  -GC      Reps 15  10  -GC      Time 15  5-10 sec  -GC        User Key  (r) = Recorded By, (t) = Taken By, (c) = Cosigned By    Initials Name Provider Type     Luis Fernando Jennings, PT Physical Therapist                      Manual Rx (last 36 hours)      Manual Treatments     Row Name 04/19/21 1230             Manual Rx 1    Manual Rx 1 Location  PROM left shoulder for flexion/scaption/ER/IR  -GC      Manual Rx 1 Type  patient supine  -GC      Manual Rx 1 Duration  15 min  -GC        User Key  (r) = Recorded By, (t) = Taken By, (c) = Cosigned By    Initials Name Provider Type     Luis Fernando Jennings, PT Physical Therapist                             Time Calculation:   Start Time: 1230  Stop Time: 1334  Time Calculation (min): 64 min  Therapy Charges for Today     Code Description Service Date Service Provider Modifiers Qty    [id number]  PT THER PROC EA 15 MIN 4/19/2021 Luis Fernando Jennings, PT GP 1    [id number] HC PT MANUAL THERAPY EA 15 MIN 4/19/2021 Luis Fernando Jennings, PT GP 1                    Luis Fernando Jennings PT  4/19/2021

## 2021-04-21 ENCOUNTER — HOSPITAL ENCOUNTER (OUTPATIENT)
Dept: PHYSICAL THERAPY | Facility: HOSPITAL | Age: 46
Setting detail: THERAPIES SERIES
Discharge: HOME | End: 2021-04-21

## 2021-04-21 DIAGNOSIS — Z98.890 STATUS POST ARTHROSCOPY OF LEFT SHOULDER: Primary | ICD-10-CM

## 2021-04-21 PROCEDURE — 97140 MANUAL THERAPY 1/> REGIONS: CPT | Performed by: PHYSICAL THERAPIST

## 2021-04-21 PROCEDURE — 97110 THERAPEUTIC EXERCISES: CPT | Performed by: PHYSICAL THERAPIST

## 2021-04-21 NOTE — THERAPY TREATMENT NOTE
Outpatient Physical Therapy Ortho Treatment Note   Veronica Littlejohn     Patient Name: Jose Barber  : 1975  MRN: [id number]  Today's Date: 2021      Visit Date: 2021    Visit Dx:    ICD-10-CM ICD-9-CM   1. Status post arthroscopy of left shoulder  Z98.890 V45.89       Patient Active Problem List   Diagnosis   • S/P ACL repair   • TIA due to embolism (CMS/HCC)   • Vertebral artery dissection (CMS/HCC)   • Mixed hyperlipidemia   • Complete tear of left rotator cuff   • Biceps tendinitis of left upper extremity   • Subacromial impingement of left shoulder   • Status post arthroscopy of shoulder, rotator cuff repair with use of bio inductive implant, biceps tenodesis, extensive debridement including glenohumeral and subacromial space,  DOS 2021        Past Medical History:   Diagnosis Date   • Elevated cholesterol    • Stroke (CMS/HCC)     TIA   • TIA (transient ischemic attack)     3 years ago        Past Surgical History:   Procedure Laterality Date   • BASAL CELL CARCINOMA EXCISION     • KNEE ARTHROSCOPY W/ ACL RECONSTRUCTION     • SHOULDER ARTHROSCOPY W/ ROTATOR CUFF REPAIR Left 2021    Procedure: SHOULDER ARTHROSCOPY, rotator cuff repair with bio-inductive implant, open biceps tenodesis, extensive debridement;  Surgeon: Pete Zñuiga MD;  Location: [facility];  Service: Orthopedics;  Laterality: Left;       PT Ortho     Row Name 21 1300       Subjective Comments    Subjective Comments  Pt states his shoulder is doing well.  -      User Key  (r) = Recorded By, (t) = Taken By, (c) = Cosigned By    Initials Name Provider Type    Luis Fernando Oconnor, PT Physical Therapist                      PT Assessment/Plan     Row Name 21 1300          PT Assessment    Assessment Comments  Pt continue sto show increasing shoulder ROM and most of the soreness from the slip on the stairs seems to have subsided.  -        PT Plan    PT Plan Comments  Pt is to continue his HEP daily.  He has MD follow up again 4/27. Will continue as advised.  -GC       User Key  (r) = Recorded By, (t) = Taken By, (c) = Cosigned By    Initials Name Provider Type     Luis Fernando Jennings, PT Physical Therapist          Modalities     Row Name 04/21/21 1300             Moist Heat    MH Applied  Yes  -GC      Location  left shoulder with pt sitting  -GC      PT Moist Heat Minutes  10  -GC      MH Prior to Rx  Yes  -GC         Other Treatment Provided    Rx Minutes  10 mins  -GC        User Key  (r) = Recorded By, (t) = Taken By, (c) = Cosigned By    Initials Name Provider Type     Luis Fernando Jennings, PT Physical Therapist        OP Exercises     Row Name 04/21/21 1403 04/21/21 1300          Subjective Comments    Subjective Comments  --  Pt states his shoulder is doing well.  -GC        Total Minutes    48570 - PT Therapeutic Exercise Minutes  15  -GC  --     46825 - PT Manual Therapy Minutes  15  -GC  --        Exercise 1    Exercise Name 1  --  Cane stretch-FLEX  -GC     Cueing 1  --  Verbal;Demo  -GC     Reps 1  --  15  -GC     Time 1  --  5 secs  -GC        Exercise 2    Exercise Name 2  --  Cane stretch-ABD  -GC     Cueing 2  --  Verbal;Demo  -GC     Reps 2  --  15  -GC     Time 2  --  5 secs  -GC        Exercise 3    Exercise Name 3  --  Cane stretch-ER  -GC     Cueing 3  --  Verbal;Demo  -GC     Reps 3  --  15  -GC     Time 3  --  5 secs  -GC        Exercise 4    Exercise Name 4  --  Pulley-FLEX  -GC     Cueing 4  --  Verbal;Demo  -GC     Time 4  --  4 min  -GC        Exercise 5    Exercise Name 5  --  Pulley-Scaption  -GC     Cueing 5  --  Verbal;Demo  -GC     Time 5  --  4 min  -GC        Exercise 6    Exercise Name 6  --  Shoulder ER vs theraband  -GC     Cueing 6  --  Verbal;Demo  -GC     Reps 6  --  25  -GC     Time 6  --  black  -GC        Exercise 7    Exercise Name 7  --  Shoulder IR vs theraband  -GC     Cueing 7  --  Verbal;Demo  -GC     Reps 7  --  25  -GC     Time 7  --  silver  -GC        Exercise 8     Exercise Name 8  --  Shoulder Rows vs theraband  -GC     Cueing 8  --  Verbal;Demo  -GC     Reps 8  --  25  -GC     Time 8  --  silver  -GC        Exercise 9    Exercise Name 9  --  Shoulder EXT vs theraband  -GC     Cueing 9  --  Verbal;Demo  -GC     Reps 9  --  25  -GC     Time 9  --  silver  -GC        Exercise 10    Exercise Name 10  --  sidelying shoulder ER  -GC     Cueing 10  --  Verbal;Demo  -GC     Reps 10  --  30  -GC     Time 10  --  4#  -GC        Exercise 11    Exercise Name 11  --  scaption thumb up  -GC     Cueing 11  --  Verbal;Demo  -GC     Reps 11  --  25  -GC     Time 11  --  3#  -GC        Exercise 12    Exercise Name 12  --  scaption thumb down  -GC     Cueing 12  --  Verbal;Demo  -GC     Reps 12  --  25  -GC     Time 12  --  3#  -GC        Exercise 13    Exercise Name 13  --  Prone Hor ABD 90  -GC     Cueing 13  --  Verbal;Tactile  -GC     Reps 13  --  25  -GC     Time 13  --  4#  -GC        Exercise 14    Exercise Name 14  --  Prone Hor   -GC     Cueing 14  --  Verbal;Tactile  -GC     Reps 14  --  25  -GC     Time 14  --  4#  -GC        Exercise 15    Exercise Name 15  --  wall walk  -GC     Cueing 15  --  Verbal;Demo  -GC     Reps 15  --  10  -GC     Time 15  --  5-10 sec  -GC       User Key  (r) = Recorded By, (t) = Taken By, (c) = Cosigned By    Initials Name Provider Type     Luis Fernando Jennings, PT Physical Therapist                      Manual Rx (last 36 hours)      Manual Treatments     Row Name 04/21/21 1403 04/21/21 1300          Total Minutes    56265 - PT Manual Therapy Minutes  15  -GC  --        Manual Rx 1    Manual Rx 1 Location  --  PROM left shoulder for flexion/scaption/ER/IR  -GC     Manual Rx 1 Type  --  patient supine  -GC     Manual Rx 1 Duration  --  15 min  -GC       User Key  (r) = Recorded By, (t) = Taken By, (c) = Cosigned By    Initials Name Provider Type     Luis Fernando Jennings, PT Physical Therapist                             Time Calculation:   Start Time:  1300  Stop Time: 1403  Time Calculation (min): 63 min  Therapy Charges for Today     Code Description Service Date Service Provider Modifiers Qty    [id number]  PT THER PROC EA 15 MIN 4/21/2021 Luis Fernando Jennings, PT GP 1    [id number]  PT MANUAL THERAPY EA 15 MIN 4/21/2021 Luis Fernando Jennings, PT GP 1                    Luis Fernando Jennings, PT  4/21/2021

## 2021-04-27 ENCOUNTER — HOSPITAL ENCOUNTER (OUTPATIENT)
Dept: PHYSICAL THERAPY | Facility: HOSPITAL | Age: 46
Setting detail: THERAPIES SERIES
Discharge: HOME | End: 2021-04-27

## 2021-04-27 ENCOUNTER — OFFICE VISIT (OUTPATIENT)
Dept: ORTHOPEDIC SURGERY | Facility: CLINIC | Age: 46
End: 2021-04-27

## 2021-04-27 VITALS — WEIGHT: 188 LBS | BODY MASS INDEX: 27.85 KG/M2 | HEIGHT: 69 IN

## 2021-04-27 DIAGNOSIS — Z98.890 STATUS POST ARTHROSCOPY OF SHOULDER: Primary | ICD-10-CM

## 2021-04-27 DIAGNOSIS — Z98.890 STATUS POST ARTHROSCOPY OF LEFT SHOULDER: Primary | ICD-10-CM

## 2021-04-27 PROCEDURE — 97140 MANUAL THERAPY 1/> REGIONS: CPT

## 2021-04-27 PROCEDURE — 99212 OFFICE O/P EST SF 10 MIN: CPT | Performed by: ORTHOPAEDIC SURGERY

## 2021-04-27 NOTE — THERAPY TREATMENT NOTE
Outpatient Physical Therapy Ortho Treatment Note   Veronica Littlejohn     Patient Name: Jose Barber  : 1975  MRN: [id number]  Today's Date: 2021      Visit Date: 2021    Visit Dx:    ICD-10-CM ICD-9-CM   1. Status post arthroscopy of left shoulder  Z98.890 V45.89       Patient Active Problem List   Diagnosis   • S/P ACL repair   • TIA due to embolism (CMS/HCC)   • Vertebral artery dissection (CMS/HCC)   • Mixed hyperlipidemia   • Complete tear of left rotator cuff   • Biceps tendinitis of left upper extremity   • Subacromial impingement of left shoulder   • Status post arthroscopy of shoulder, rotator cuff repair with use of bio inductive implant, biceps tenodesis, extensive debridement including glenohumeral and subacromial space,  DOS 2021        Past Medical History:   Diagnosis Date   • Elevated cholesterol    • Stroke (CMS/HCC)     TIA   • TIA (transient ischemic attack)     3 years ago        Past Surgical History:   Procedure Laterality Date   • BASAL CELL CARCINOMA EXCISION     • KNEE ARTHROSCOPY W/ ACL RECONSTRUCTION     • SHOULDER ARTHROSCOPY W/ ROTATOR CUFF REPAIR Left 2021    Procedure: SHOULDER ARTHROSCOPY, rotator cuff repair with bio-inductive implant, open biceps tenodesis, extensive debridement;  Surgeon: Pete Zuñiga MD;  Location: [facility];  Service: Orthopedics;  Laterality: Left;                       PT Assessment/Plan     Row Name 21 1200          PT Assessment    Assessment Comments  Heat and manual PROM only performed today as patient requested this as he is running late from his MD appointment. Patient tolerated his treatment well today.  -AS        PT Plan    PT Plan Comments  Continue with current treatment plan.  -AS       User Key  (r) = Recorded By, (t) = Taken By, (c) = Cosigned By    Initials Name Provider Type    AS Kyle Gallagher, PT Physical Therapist          Modalities     Row Name 21 1200             Moist Heat    MH  Applied  Yes  -AS      Location  left shoulder with pt sitting  -AS      PT Moist Heat Minutes  10  -AS      MH Prior to Rx  Yes  -AS         Other Treatment Provided    Rx Minutes  10 mins  -AS        User Key  (r) = Recorded By, (t) = Taken By, (c) = Cosigned By    Initials Name Provider Type    AS Kyle Gallagher, PT Physical Therapist        OP Exercises     Row Name 04/27/21 1250 04/27/21 1200          Subjective Comments    Subjective Comments  --  Patient states he just left his MD's office and he states his MD is pleased with his progress so far. He states the MD is leaving it up to him and his therapist to start to wean him from PT. Patient also states he is running late and would like to get heat and then manual stretching. He states he will do his exercises at home.  -AS        Total Minutes    32967 - PT Manual Therapy Minutes  15  -AS  --       User Key  (r) = Recorded By, (t) = Taken By, (c) = Cosigned By    Initials Name Provider Type    AS Kyle Gallagher, PT Physical Therapist                      Manual Rx (last 36 hours)      Manual Treatments     Row Name 04/27/21 1250 04/27/21 1100          Total Minutes    14022 - PT Manual Therapy Minutes  15  -AS  --        Manual Rx 1    Manual Rx 1 Location  --  PROM left shoulder for flexion/scaption/ER/IR  -AS     Manual Rx 1 Type  --  patient supine  -AS     Manual Rx 1 Duration  --  15 min  -AS       User Key  (r) = Recorded By, (t) = Taken By, (c) = Cosigned By    Initials Name Provider Type    AS Kyle Gallagher, PT Physical Therapist                             Time Calculation:   Start Time: 1220  Stop Time: 1250  Time Calculation (min): 30 min  Time Calculation- PT  Start Time: 1220  Stop Time: 1250  Time Calculation (min): 30 min  Timed Charges  26131 - PT Manual Therapy Minutes: 15  Untimed Charges  PT Moist Heat Minutes: 10  Total Minutes  Timed Charges Total Minutes: 15  Untimed Charges Total Minutes: 10   Total Minutes:  25  Therapy Charges for Today     Code Description Service Date Service Provider Modifiers Qty    [id number] HC PT MANUAL THERAPY EA 15 MIN 4/27/2021 Kyle Gallagher, PT GP 1                    Kyle Gallagher, PT  4/27/2021

## 2021-04-27 NOTE — PROGRESS NOTES
Subjective:     Patient ID: Jose Barber is a 46 y.o. male.    Chief Complaint: F/u s/p left shoulder rotator cuff repair and biceps tenodesis  DOS 1/20/2021    History of Present Illness  Jose Barber returns to clinic today for evaluation of left shoulder status post rotator cuff repair and biceps tenodesis. He reports he is continuing to improve his strength and range-of-motion. He had a episode with increased pain trying to support himself with his left arm. His exacerbated pain has improved since that time. The residual pain is aching in quality without swelling, tingling, or numbness. He is continuing physical therapy with Luis Fernando Aparicio.      Social History     Occupational History   • Not on file   Tobacco Use   • Smoking status: Never Smoker   • Smokeless tobacco: Never Used   Vaping Use   • Vaping Use: Never used   Substance and Sexual Activity   • Alcohol use: Yes     Alcohol/week: 1.0 standard drinks     Types: 1 Cans of beer per week     Comment: occasional   • Drug use: Never   • Sexual activity: Yes      Past Medical History:   Diagnosis Date   • Elevated cholesterol    • Stroke (CMS/HCC)     TIA   • TIA (transient ischemic attack)     3 years ago     Past Surgical History:   Procedure Laterality Date   • BASAL CELL CARCINOMA EXCISION     • KNEE ARTHROSCOPY W/ ACL RECONSTRUCTION     • SHOULDER ARTHROSCOPY W/ ROTATOR CUFF REPAIR Left 1/20/2021    Procedure: SHOULDER ARTHROSCOPY, rotator cuff repair with bio-inductive implant, open biceps tenodesis, extensive debridement;  Surgeon: Pete Zuñiga MD;  Location: [facility];  Service: Orthopedics;  Laterality: Left;       Family History   Problem Relation Age of Onset   • Melanoma Maternal Grandmother    • Cancer Maternal Grandmother    • Heart attack Maternal Grandfather    • Heart attack Paternal Grandfather    • Cancer Paternal Grandfather    • Migraines Mother          Review of Systems        Objective:  Vitals:    04/27/21 1122   Weight:  "85.3 kg (188 lb)   Height: 175.3 cm (69\")         04/27/21  1122   Weight: 85.3 kg (188 lb)     Body mass index is 27.76 kg/m².  General: No acute distress.  Resp: normal respiratory effort  Skin: no rashes or wounds; normal turgor  Psych: mood and affect appropriate; recent and remote memory intact          Ortho Exam     left shoulder-  Tenderness  located anterior  FF-   Active- 175    Strength- 4/5  ER-      Active- 45 with tenderness   Strength- 4/5  IR Strength- 4+/5 on belly press test    Brisk cap refill to all digits, palpable radial pulse    Positive sensation to light touch palmar, dorsal aspects of small and index fingers and anatomic snuffbox left hand        Imaging:  None today    Assessment:        1. Status post arthroscopy of shoulder, rotator cuff repair with use of bio inductive implant, biceps tenodesis, extensive debridement including glenohumeral and subacromial space,  DOS 01/20/2021           Plan:      1. Discussed treatment options at length with patient at today's visit including continued activity management, at-home exercises, and physical therapy to improve strength and range-of-motion. Given the patient's examination and overall improvement, discussed with patient that findings do not suggest a re-tear at this time. We will continue to monitor his physical therapy and progress without repeat imaging.  2. Recommended patient reduce physical therapy frequency as tolerated. He may discontinue physical therapy and transition to at-home exercises over the next 6 to 8 weeks.  3. Follow-up in two months for evaluation of strength and range-of-motion.      Jose Barber was in agreement with plan and had all questions answered.     Orders:  No orders of the defined types were placed in this encounter.      Medications:  No orders of the defined types were placed in this encounter.      Followup:  Return in about 2 months (around 6/27/2021).    Diagnoses and all orders for this visit:    1. " Status post arthroscopy of shoulder, rotator cuff repair with use of bio inductive implant, biceps tenodesis, extensive debridement including glenohumeral and subacromial space,  DOS 01/20/2021 (Primary)        SCRIBE ATTESTATION:  I, Gal Wynne, attest that all medical record entries for this patient were documented by me acting as a medical scribe for Pete Zuñiga MD.    PROVIDER ATTESTATION:  I, Pete Zuñiga MD, personally performed the services described in this documentation. All medical record entries made by the scribe were at my direction and in my presence. I have reviewed the chart and discharge instructions and agree that the record reflects my personal performance and is accurate and complete.  Pete Zuñiga MD.    Electronically signed: Pete Zuñiga MD 4/27/2021 14:20 EDT       Dictated utilizing Dragon dictation

## 2021-04-30 ENCOUNTER — HOSPITAL ENCOUNTER (OUTPATIENT)
Dept: PHYSICAL THERAPY | Facility: HOSPITAL | Age: 46
Setting detail: THERAPIES SERIES
Discharge: HOME | End: 2021-04-30

## 2021-04-30 DIAGNOSIS — Z98.890 STATUS POST ARTHROSCOPY OF LEFT SHOULDER: Primary | ICD-10-CM

## 2021-04-30 PROCEDURE — 97110 THERAPEUTIC EXERCISES: CPT | Performed by: PHYSICAL THERAPIST

## 2021-04-30 PROCEDURE — 97140 MANUAL THERAPY 1/> REGIONS: CPT | Performed by: PHYSICAL THERAPIST

## 2021-05-05 ENCOUNTER — HOSPITAL ENCOUNTER (OUTPATIENT)
Dept: PHYSICAL THERAPY | Facility: HOSPITAL | Age: 46
Setting detail: THERAPIES SERIES
Discharge: HOME | End: 2021-05-05

## 2021-05-05 DIAGNOSIS — Z98.890 STATUS POST ARTHROSCOPY OF LEFT SHOULDER: Primary | ICD-10-CM

## 2021-05-05 PROCEDURE — 97110 THERAPEUTIC EXERCISES: CPT | Performed by: PHYSICAL THERAPIST

## 2021-05-05 PROCEDURE — 97140 MANUAL THERAPY 1/> REGIONS: CPT | Performed by: PHYSICAL THERAPIST

## 2021-05-05 NOTE — THERAPY TREATMENT NOTE
Outpatient Physical Therapy Ortho Treatment Note   [facility]     Patient Name: Jose Barber  : 1975  MRN: [id number]  Today's Date: 2021      Visit Date: 2021    Visit Dx:    ICD-10-CM ICD-9-CM   1. Status post arthroscopy of left shoulder  Z98.890 V45.89       Patient Active Problem List   Diagnosis   • S/P ACL repair   • TIA due to embolism (CMS/HCC)   • Vertebral artery dissection (CMS/HCC)   • Mixed hyperlipidemia   • Complete tear of left rotator cuff   • Biceps tendinitis of left upper extremity   • Subacromial impingement of left shoulder   • Status post arthroscopy of shoulder, rotator cuff repair with use of bio inductive implant, biceps tenodesis, extensive debridement including glenohumeral and subacromial space,  DOS 2021        Past Medical History:   Diagnosis Date   • Elevated cholesterol    • Stroke (CMS/HCC)     TIA   • TIA (transient ischemic attack)     3 years ago        Past Surgical History:   Procedure Laterality Date   • BASAL CELL CARCINOMA EXCISION     • KNEE ARTHROSCOPY W/ ACL RECONSTRUCTION     • SHOULDER ARTHROSCOPY W/ ROTATOR CUFF REPAIR Left 2021    Procedure: SHOULDER ARTHROSCOPY, rotator cuff repair with bio-inductive implant, open biceps tenodesis, extensive debridement;  Surgeon: Pete Zuñiga MD;  Location: [facility];  Service: Orthopedics;  Laterality: Left;       PT Ortho     Row Name 21 1200       Subjective Comments    Subjective Comments  Pt states his shoulder is feleing pretty good.  -      User Key  (r) = Recorded By, (t) = Taken By, (c) = Cosigned By    Initials Name Provider Type     Luis Fernando Jennings, PT Physical Therapist                      PT Assessment/Plan     Row Name 21 1200          PT Assessment    Assessment Comments  Pt is doing very well with improving function and decreased c/o pain.  -        PT Plan    PT Plan Comments  Pt is to continue his HEP daily.  -       User Key  (r) = Recorded By, (t) =  Taken By, (c) = Cosigned By    Initials Name Provider Type    GC Luis Fernando Jennings, PT Physical Therapist            OP Exercises     Row Name 05/05/21 1200             Subjective Comments    Subjective Comments  Pt states his shoulder is feleing pretty good.  -GC         Total Minutes    99129 - PT Therapeutic Exercise Minutes  15  -GC      03156 - PT Manual Therapy Minutes  15  -GC         Exercise 1    Exercise Name 1  Cane stretch-FLEX  -GC      Cueing 1  Verbal;Demo  -GC      Reps 1  15  -GC      Time 1  5 secs  -GC         Exercise 2    Exercise Name 2  Cane stretch-ABD  -GC      Cueing 2  Verbal;Demo  -GC      Reps 2  15  -GC      Time 2  5 secs  -GC         Exercise 3    Exercise Name 3  Cane stretch-ER  -GC      Cueing 3  Verbal;Demo  -GC      Reps 3  15  -GC      Time 3  5 secs  -GC         Exercise 4    Exercise Name 4  Pulley-FLEX  -GC      Cueing 4  Verbal;Demo  -GC      Time 4  4 min  -GC         Exercise 5    Exercise Name 5  Pulley-Scaption  -GC      Cueing 5  Verbal;Demo  -GC      Time 5  4 min  -GC         Exercise 6    Exercise Name 6  Shoulder ER vs theraband  -GC      Cueing 6  Verbal;Demo  -GC      Reps 6  25  -GC      Time 6  black  -GC         Exercise 7    Exercise Name 7  Shoulder IR vs theraband  -GC      Cueing 7  Verbal;Demo  -GC      Reps 7  25  -GC      Time 7  silver  -GC         Exercise 8    Exercise Name 8  Shoulder Rows vs theraband  -GC      Cueing 8  Verbal;Demo  -GC      Reps 8  25  -GC      Time 8  silver  -GC         Exercise 9    Exercise Name 9  Shoulder EXT vs theraband  -GC      Cueing 9  Verbal;Demo  -GC      Reps 9  25  -GC      Time 9  silver  -GC         Exercise 10    Exercise Name 10  sidelying shoulder ER  -GC      Cueing 10  Verbal;Demo  -GC      Reps 10  30  -GC      Time 10  6#  -GC         Exercise 11    Exercise Name 11  scaption thumb up  -GC      Cueing 11  Verbal;Demo  -GC      Reps 11  25  -GC      Time 11  4#  -GC         Exercise 12    Exercise Name 12   scaption thumb down  -GC      Cueing 12  Verbal;Demo  -GC      Reps 12  25  -GC      Time 12  4#  -GC         Exercise 13    Exercise Name 13  Prone Hor ABD 90  -GC      Cueing 13  Verbal;Tactile  -GC      Reps 13  25  -GC      Time 13  4#  -GC         Exercise 14    Exercise Name 14  Prone Hor   -GC      Cueing 14  Verbal;Tactile  -GC      Reps 14  25  -GC      Time 14  4#  -GC         Exercise 15    Exercise Name 15  wall walk  -GC      Cueing 15  Verbal;Demo  -GC      Reps 15  10  -GC      Time 15  5-10 sec  -GC        User Key  (r) = Recorded By, (t) = Taken By, (c) = Cosigned By    Initials Name Provider Type     Luis Fernando Jennings, PT Physical Therapist                      Manual Rx (last 36 hours)      Manual Treatments     Row Name 05/05/21 1200             Total Minutes    64723 - PT Manual Therapy Minutes  15  -GC         Manual Rx 1    Manual Rx 1 Location  PROM left shoulder for flexion/scaption/ER/IR  -GC      Manual Rx 1 Type  patient supine  -GC      Manual Rx 1 Duration  15 min  -GC        User Key  (r) = Recorded By, (t) = Taken By, (c) = Cosigned By    Initials Name Provider Type     Luis Fernando Jennings, PT Physical Therapist                             Time Calculation:   Start Time: 1200  Stop Time: 1254  Time Calculation (min): 54 min  Time Calculation- PT  Start Time: 1200  Stop Time: 1254  Time Calculation (min): 54 min  Timed Charges  82850 - PT Therapeutic Exercise Minutes: 15  07702 - PT Manual Therapy Minutes: 15  Total Minutes  Timed Charges Total Minutes: 30   Total Minutes: 30  Therapy Charges for Today     Code Description Service Date Service Provider Modifiers Qty    35469410522 HC PT THER PROC EA 15 MIN 5/5/2021 Luis Fernando Jennings, PT GP 1    72737646079 HC PT MANUAL THERAPY EA 15 MIN 5/5/2021 Luis Fernando Jennings PT GP 1                    Luis Fernando Jennings PT  5/5/2021

## 2021-05-07 ENCOUNTER — APPOINTMENT (OUTPATIENT)
Dept: PHYSICAL THERAPY | Facility: HOSPITAL | Age: 46
End: 2021-05-07

## 2021-05-12 ENCOUNTER — DOCUMENTATION (OUTPATIENT)
Dept: PHYSICAL THERAPY | Facility: HOSPITAL | Age: 46
End: 2021-05-12

## 2021-05-21 ENCOUNTER — HOSPITAL ENCOUNTER (OUTPATIENT)
Dept: PHYSICAL THERAPY | Facility: HOSPITAL | Age: 46
Setting detail: THERAPIES SERIES
Discharge: HOME | End: 2021-05-21

## 2021-05-21 DIAGNOSIS — Z98.890 STATUS POST ARTHROSCOPY OF LEFT SHOULDER: Primary | ICD-10-CM

## 2021-05-21 PROCEDURE — 97110 THERAPEUTIC EXERCISES: CPT

## 2021-05-28 ENCOUNTER — HOSPITAL ENCOUNTER (OUTPATIENT)
Dept: PHYSICAL THERAPY | Facility: HOSPITAL | Age: 46
Setting detail: THERAPIES SERIES
Discharge: HOME | End: 2021-05-28

## 2021-05-28 DIAGNOSIS — Z98.890 STATUS POST ARTHROSCOPY OF LEFT SHOULDER: Primary | ICD-10-CM

## 2021-05-28 PROCEDURE — 97140 MANUAL THERAPY 1/> REGIONS: CPT | Performed by: PHYSICAL THERAPIST

## 2021-05-28 NOTE — THERAPY TREATMENT NOTE
Outpatient Physical Therapy Ortho Treatment Note   [place]     Patient Name: Jose Barber  : 1975  MRN: [id number]  Today's Date: 2021      Visit Date: 2021    Visit Dx:    ICD-10-CM ICD-9-CM   1. Status post arthroscopy of left shoulder  Z98.890 V45.89       Patient Active Problem List   Diagnosis   • S/P ACL repair   • TIA due to embolism (CMS/HCC)   • Vertebral artery dissection (CMS/HCC)   • Mixed hyperlipidemia   • Complete tear of left rotator cuff   • Biceps tendinitis of left upper extremity   • Subacromial impingement of left shoulder   • Status post arthroscopy of shoulder, rotator cuff repair with use of bio inductive implant, biceps tenodesis, extensive debridement including glenohumeral and subacromial space,  DOS 2021        Past Medical History:   Diagnosis Date   • Elevated cholesterol    • Stroke (CMS/HCC)     TIA   • TIA (transient ischemic attack)     3 years ago        Past Surgical History:   Procedure Laterality Date   • BASAL CELL CARCINOMA EXCISION     • KNEE ARTHROSCOPY W/ ACL RECONSTRUCTION     • SHOULDER ARTHROSCOPY W/ ROTATOR CUFF REPAIR Left 2021    Procedure: SHOULDER ARTHROSCOPY, rotator cuff repair with bio-inductive implant, open biceps tenodesis, extensive debridement;  Surgeon: Pete Zuñiga MD;  Location: [facility];  Service: Orthopedics;  Laterality: Left;       PT Ortho     Row Name 21 6687       Subjective Comments    Subjective Comments  Pt states his shoulder is feeling better only doing limited exercises.  -      User Key  (r) = Recorded By, (t) = Taken By, (c) = Cosigned By    Initials Name Provider Type     Luis Fernando Jennings, PT Physical Therapist                      PT Assessment/Plan     Row Name 21 4910          PT Assessment    Assessment Comments  Pt is showing increased shoulder range with his stretches and he is reporting decreased pain and increased strength with his daily activity.  -        PT Plan     PT Plan Comments  Pt is to continue the limited exercise routine daily. will re-ck again next week.  -GC       User Key  (r) = Recorded By, (t) = Taken By, (c) = Cosigned By    Initials Name Provider Type     Luis Fernando Jennings PT Physical Therapist            OP Exercises     Row Name 05/28/21 0910             Subjective Comments    Subjective Comments  Pt states his shoulder is feeling better only doing limited exercises.  -GC         Total Minutes    53361 - PT Manual Therapy Minutes  15  -GC        User Key  (r) = Recorded By, (t) = Taken By, (c) = Cosigned By    Initials Name Provider Type     Luis Fernando Jennings PT Physical Therapist                      Manual Rx (last 36 hours)      Manual Treatments     Row Name 05/28/21 0910             Total Minutes    27128 - PT Manual Therapy Minutes  15  -GC         Manual Rx 1    Manual Rx 1 Location  PROM left shoulder for flexion/scaption/ER/IR  -      Manual Rx 1 Type  patient supine  -      Manual Rx 1 Duration  15 min  -        User Key  (r) = Recorded By, (t) = Taken By, (c) = Cosigned By    Initials Name Provider Type     Luis Fernando Jennings PT Physical Therapist                             Time Calculation:   Start Time: 0910  Stop Time: 0934  Time Calculation (min): 24 min  Timed Charges  78937 - PT Manual Therapy Minutes: 15  Total Minutes  Timed Charges Total Minutes: 15   Total Minutes: 15  Therapy Charges for Today     Code Description Service Date Service Provider Modifiers Qty    04380741943 HC PT MANUAL THERAPY EA 15 MIN 5/28/2021 Luis Fernando Jennings PT GP 1                    Luis Fernando Jennings PT  5/28/2021

## 2021-06-04 ENCOUNTER — APPOINTMENT (OUTPATIENT)
Dept: PHYSICAL THERAPY | Facility: HOSPITAL | Age: 46
End: 2021-06-04

## 2021-06-07 ENCOUNTER — HOSPITAL ENCOUNTER (OUTPATIENT)
Dept: PHYSICAL THERAPY | Facility: HOSPITAL | Age: 46
Setting detail: THERAPIES SERIES
Discharge: HOME | End: 2021-06-07

## 2021-06-07 DIAGNOSIS — Z98.890 STATUS POST ARTHROSCOPY OF LEFT SHOULDER: Primary | ICD-10-CM

## 2021-06-07 PROCEDURE — 97140 MANUAL THERAPY 1/> REGIONS: CPT | Performed by: PHYSICAL THERAPIST

## 2021-06-07 NOTE — THERAPY TREATMENT NOTE
Outpatient Physical Therapy Ortho Treatment Note   [place]     Patient Name: Jose Barber  : 1975  MRN: [id number]  Today's Date: 2021      Visit Date: 2021    Visit Dx:    ICD-10-CM ICD-9-CM   1. Status post arthroscopy of left shoulder  Z98.890 V45.89       Patient Active Problem List   Diagnosis   • S/P ACL repair   • TIA due to embolism (CMS/HCC)   • Vertebral artery dissection (CMS/HCC)   • Mixed hyperlipidemia   • Complete tear of left rotator cuff   • Biceps tendinitis of left upper extremity   • Subacromial impingement of left shoulder   • Status post arthroscopy of shoulder, rotator cuff repair with use of bio inductive implant, biceps tenodesis, extensive debridement including glenohumeral and subacromial space,  DOS 2021        Past Medical History:   Diagnosis Date   • Elevated cholesterol    • Stroke (CMS/HCC)     TIA   • TIA (transient ischemic attack)     3 years ago        Past Surgical History:   Procedure Laterality Date   • BASAL CELL CARCINOMA EXCISION     • KNEE ARTHROSCOPY W/ ACL RECONSTRUCTION     • SHOULDER ARTHROSCOPY W/ ROTATOR CUFF REPAIR Left 2021    Procedure: SHOULDER ARTHROSCOPY, rotator cuff repair with bio-inductive implant, open biceps tenodesis, extensive debridement;  Surgeon: Pete Zuñiga MD;  Location: [facility];  Service: Orthopedics;  Laterality: Left;       PT Ortho     Row Name 21 3381       Subjective Comments    Subjective Comments  Pt states his shoulder is feeling better and stronger. He swam laps the other day and did get sore, but he said the freedom of movement was good.  -GC       Left Upper Ext    Lt Shoulder Abduction AROM  160 degrees  -GC    Lt Shoulder Flexion AROM  165 degrees  -GC    Lt Shoulder External Rotation AROM  75 degrees  -GC    Lt Shoulder Internal Rotation AROM  65 degrees  -GC       MMT Left Upper Ext    Lt Shoulder External Rotation MMT, Gross Movement  (4+/5) good plus  -GC    Lt Upper Extremity  Comments   scaption strength is 4+/5  -GC      User Key  (r) = Recorded By, (t) = Taken By, (c) = Cosigned By    Initials Name Provider Type     Luis Fernando Jennings PT Physical Therapist                      PT Assessment/Plan     Row Name 06/07/21 1130          PT Assessment    Assessment Comments  Pt is doing well with increased shoulder ROM and strength and reported improved function.  -GC        PT Plan    PT Plan Comments  Pt is to continue his hEP daily. will re-ck again next week.  -GC       User Key  (r) = Recorded By, (t) = Taken By, (c) = Cosigned By    Initials Name Provider Type     Luis Fernando Jennings PT Physical Therapist            OP Exercises     Row Name 06/07/21 1130             Subjective Comments    Subjective Comments  Pt states his shoulder is feeling better and stronger. He swam laps the other day and did get sore, but he said the freedom of movement was good.  -GC         Total Minutes    76031 - PT Manual Therapy Minutes  15  -GC        User Key  (r) = Recorded By, (t) = Taken By, (c) = Cosigned By    Initials Name Provider Type     Luis Fernando Jennings, PT Physical Therapist                      Manual Rx (last 36 hours)      Manual Treatments     Row Name 06/07/21 1130             Total Minutes    13034 - PT Manual Therapy Minutes  15  -GC         Manual Rx 1    Manual Rx 1 Location  PROM left shoulder for flexion/scaption/ER/IR  -GC      Manual Rx 1 Type  patient supine  -GC      Manual Rx 1 Duration  15 min  -GC        User Key  (r) = Recorded By, (t) = Taken By, (c) = Cosigned By    Initials Name Provider Type     Luis Fernando Jennings, PT Physical Therapist                             Time Calculation:   Start Time: 1130  Stop Time: 1202  Time Calculation (min): 32 min  Timed Charges  64769 - PT Manual Therapy Minutes: 15  Total Minutes  Timed Charges Total Minutes: 15   Total Minutes: 15  Therapy Charges for Today     Code Description Service Date Service Provider Modifiers Qty    [id number]   PT MANUAL THERAPY EA 15 MIN 6/7/2021 Luis Fernando Jennings, PT GP 1                    Luis Fernando Jennings, PT  6/7/2021

## 2021-06-16 ENCOUNTER — HOSPITAL ENCOUNTER (OUTPATIENT)
Dept: PHYSICAL THERAPY | Facility: HOSPITAL | Age: 46
Setting detail: THERAPIES SERIES
Discharge: HOME | End: 2021-06-16

## 2021-06-16 DIAGNOSIS — Z98.890 STATUS POST ARTHROSCOPY OF LEFT SHOULDER: Primary | ICD-10-CM

## 2021-06-16 PROCEDURE — 97140 MANUAL THERAPY 1/> REGIONS: CPT | Performed by: PHYSICAL THERAPIST

## 2021-06-16 NOTE — THERAPY TREATMENT NOTE
Outpatient Physical Therapy Ortho Treatment Note   [place]     Patient Name: Jose Barber  : 1975  MRN: [id number]  Today's Date: 2021      Visit Date: 2021    Visit Dx:    ICD-10-CM ICD-9-CM   1. Status post arthroscopy of left shoulder  Z98.890 V45.89       Patient Active Problem List   Diagnosis   • S/P ACL repair   • TIA due to embolism (CMS/HCC)   • Vertebral artery dissection (CMS/HCC)   • Mixed hyperlipidemia   • Complete tear of left rotator cuff   • Biceps tendinitis of left upper extremity   • Subacromial impingement of left shoulder   • Status post arthroscopy of shoulder, rotator cuff repair with use of bio inductive implant, biceps tenodesis, extensive debridement including glenohumeral and subacromial space,  DOS 2021        Past Medical History:   Diagnosis Date   • Elevated cholesterol    • Stroke (CMS/HCC)     TIA   • TIA (transient ischemic attack)     3 years ago        Past Surgical History:   Procedure Laterality Date   • BASAL CELL CARCINOMA EXCISION     • KNEE ARTHROSCOPY W/ ACL RECONSTRUCTION     • SHOULDER ARTHROSCOPY W/ ROTATOR CUFF REPAIR Left 2021    Procedure: SHOULDER ARTHROSCOPY, rotator cuff repair with bio-inductive implant, open biceps tenodesis, extensive debridement;  Surgeon: Pete Zuñiga MD;  Location: [facility];  Service: Orthopedics;  Laterality: Left;       PT Ortho     Row Name 21 1020       Subjective Comments    Subjective Comments  Pt states he has been going to the gym without any difficulty. He says his shoulder feels much stronger. He still c/o some tenderness in the anterior shoulder at times and still feels tight when he reaches behind his back.  -GC       Left Upper Ext    Lt Shoulder Abduction AROM  165 degrees  -GC    Lt Shoulder Flexion AROM  165 degrees  -GC    Lt Shoulder External Rotation AROM  80 degrees  -GC    Lt Shoulder Internal Rotation AROM  71 degrees  -GC       MMT Left Upper Ext    Lt Shoulder  External Rotation MMT, Gross Movement  (5/5) normal  -GC    Lt Upper Extremity Comments   scaption strength is 4+/5  -      User Key  (r) = Recorded By, (t) = Taken By, (c) = Cosigned By    Initials Name Provider Type    Luis Fernando Oconnor PT Physical Therapist                      PT Assessment/Plan     Row Name 06/16/21 1020          PT Assessment    Assessment Comments  Pt is doing well good ROM, strength, and function. Feel he no longer requires skilled therapy services.  -        PT Plan    PT Plan Comments  Pt is to continue his HEP daily. He has MD follow up in 2 weeks. Anticipate discharge.  -GC       User Key  (r) = Recorded By, (t) = Taken By, (c) = Cosigned By    Initials Name Provider Type    Luis Fernando Oconnor PT Physical Therapist            OP Exercises     Row Name 06/16/21 1020             Subjective Comments    Subjective Comments  Pt states he has been going to the gym without any difficulty. He says his shoulder feels much stronger. He still c/o some tenderness in the anterior shoulder at times and still feels tight when he reaches behind his back.  -         Total Minutes    08189 - PT Manual Therapy Minutes  15  -GC        User Key  (r) = Recorded By, (t) = Taken By, (c) = Cosigned By    Initials Name Provider Type     Luis Fernando Jennings PT Physical Therapist                      Manual Rx (last 36 hours)      Manual Treatments     Row Name 06/16/21 1020             Total Minutes    38596 - PT Manual Therapy Minutes  15  -GC         Manual Rx 1    Manual Rx 1 Location  PROM left shoulder for flexion/scaption/ER/IR  -GC      Manual Rx 1 Type  patient supine  -      Manual Rx 1 Duration  15 min  -        User Key  (r) = Recorded By, (t) = Taken By, (c) = Cosigned By    Initials Name Provider Type    Luis Fernando Oconnor PT Physical Therapist                             Time Calculation:   Start Time: 1020  Stop Time: 1045  Time Calculation (min): 25 min  Timed Charges  92221 - PT Manual  Therapy Minutes: 15  Total Minutes  Timed Charges Total Minutes: 15   Total Minutes: 15  Therapy Charges for Today     Code Description Service Date Service Provider Modifiers Qty    31300340745 HC PT MANUAL THERAPY EA 15 MIN 6/16/2021 Luis Fernando Jennings, PT GP 1                    Luis Fernando Jennings, PT  6/16/2021

## 2021-06-30 ENCOUNTER — OFFICE VISIT (OUTPATIENT)
Dept: ORTHOPEDIC SURGERY | Facility: CLINIC | Age: 46
End: 2021-06-30

## 2021-06-30 VITALS — WEIGHT: 188 LBS | BODY MASS INDEX: 27.85 KG/M2 | HEIGHT: 69 IN

## 2021-06-30 DIAGNOSIS — M75.42 SUBACROMIAL IMPINGEMENT OF LEFT SHOULDER: ICD-10-CM

## 2021-06-30 DIAGNOSIS — Z98.890 STATUS POST ARTHROSCOPY OF SHOULDER: Primary | ICD-10-CM

## 2021-06-30 DIAGNOSIS — M79.2 NEUROPATHIC PAIN: ICD-10-CM

## 2021-06-30 DIAGNOSIS — M75.22 BICEPS TENDINITIS OF LEFT UPPER EXTREMITY: ICD-10-CM

## 2021-06-30 PROCEDURE — 99212 OFFICE O/P EST SF 10 MIN: CPT | Performed by: ORTHOPAEDIC SURGERY

## 2021-07-27 RX ORDER — ATORVASTATIN CALCIUM 10 MG/1
TABLET, FILM COATED ORAL
Qty: 30 TABLET | Refills: 1 | Status: SHIPPED | OUTPATIENT
Start: 2021-07-27 | End: 2021-08-11 | Stop reason: SDUPTHER

## 2021-07-27 NOTE — TELEPHONE ENCOUNTER
Pt last seen 5/6/2020. Follow up scheduled 8/11/2021.       Quantity 30 for 30 days.      Please review and approve or advise.     Thank you.

## 2021-08-11 ENCOUNTER — OFFICE VISIT (OUTPATIENT)
Dept: NEUROLOGY | Facility: CLINIC | Age: 46
End: 2021-08-11

## 2021-08-11 VITALS
HEIGHT: 69 IN | OXYGEN SATURATION: 98 % | HEART RATE: 98 BPM | WEIGHT: 195 LBS | DIASTOLIC BLOOD PRESSURE: 80 MMHG | BODY MASS INDEX: 28.88 KG/M2 | SYSTOLIC BLOOD PRESSURE: 136 MMHG

## 2021-08-11 DIAGNOSIS — G45.9 TIA DUE TO EMBOLISM (HCC): Primary | ICD-10-CM

## 2021-08-11 DIAGNOSIS — I74.9 TIA DUE TO EMBOLISM (HCC): Primary | ICD-10-CM

## 2021-08-11 DIAGNOSIS — I77.74 VERTEBRAL ARTERY DISSECTION (HCC): ICD-10-CM

## 2021-08-11 DIAGNOSIS — E78.2 MIXED HYPERLIPIDEMIA: ICD-10-CM

## 2021-08-11 PROCEDURE — 99213 OFFICE O/P EST LOW 20 MIN: CPT | Performed by: NURSE PRACTITIONER

## 2021-08-11 RX ORDER — ATORVASTATIN CALCIUM 10 MG/1
10 TABLET, FILM COATED ORAL DAILY
Qty: 30 TABLET | Refills: 11 | Status: SHIPPED | OUTPATIENT
Start: 2021-08-11 | End: 2022-08-23 | Stop reason: SDUPTHER

## 2021-08-11 NOTE — PROGRESS NOTES
"DOS: 2021  NAME: Jose Barber   : 1975  PCP: Jean-Paul Levy MD    Chief Complaint   Patient presents with   • Transient Ischemic Attack      SUBJECTIVE  Neurological Problem:  46 y.o. RHW  male with \"borderline\" HTN, HLD, h/o basal cell carcinoma who presents today f/u of stroke/TIA. He is unaccompanied.      Interval History:   **For previous history, please see progress note dated 20.    Mr. Barber has a h/o TIA secondary to vertebral artery dissection he suffered in 2018, treated at [facility].  He has been since maintained on ASA plus statin therapy and when last seen in May 2020 was doing well, no neurologic changes.     He presents today, continues on ASA 81 mg plus Lipitor 10 mg.  He is tolerating medications well.  He denies any new stroke/TIA symptoms.  He continues to exercise regularly, is an avid bike trail rider.  He denies any chiropractic manipulations or any activities involving sudden neck movements.  He has had a left shoulder surgery earlier this year.  Review of lab work from 2021 shows an essentially unremarkable CMP, CBC.  His last lipid panel done in May 2020 shows a total of 123, HDL 49, LDL 63, TG 53.  He states his BP is well controlled.  He denies any signs or symptoms of sleep apnea.  He denies smoking.  Occasional alcohol use.    Review of Systems:Review of Systems   Constitutional: Negative for activity change, appetite change and fatigue.   Eyes: Negative for pain, redness and itching.   Respiratory: Negative for cough, choking and shortness of breath.    Cardiovascular: Negative for chest pain and leg swelling.   Allergic/Immunologic: Negative for environmental allergies and food allergies.   Neurological: Negative for dizziness, tremors, seizures, syncope, facial asymmetry, speech difficulty, weakness, light-headedness, numbness and headaches.   Psychiatric/Behavioral: Negative for agitation, behavioral problems, confusion, decreased concentration, " dysphoric mood, hallucinations, self-injury, sleep disturbance and suicidal ideas. The patient is not nervous/anxious and is not hyperactive.     Above ROS reviewed    The following portions of the patient's history were reviewed and updated as appropriate: allergies, current medications, past family history, past medical history, past social history, past surgical history and problem list.    Current Medications:   Current Outpatient Medications:   •  aspirin 81 MG EC tablet, Take 81 mg by mouth Daily., Disp: , Rfl:   •  atorvastatin (LIPITOR) 10 MG tablet, Take 1 tablet by mouth Daily., Disp: 30 tablet, Rfl: 11  •  clobetasol (TEMOVATE) 0.05 % cream, Apply  topically to the appropriate area as directed 2 (Two) Times a Day., Disp: , Rfl:   •  desonide (DESOWEN) 0.05 % cream, Apply  topically to the appropriate area as directed 2 (Two) Times a Day., Disp: , Rfl:   •  pimecrolimus (ELIDEL) 1 % cream, , Disp: , Rfl:   •  cyclobenzaprine (FLEXERIL) 10 MG tablet, Take 1 tablet by mouth 3 (Three) Times a Day As Needed for Muscle Spasms., Disp: 45 tablet, Rfl: 0  •  Multiple Vitamin (MULTI VITAMIN MENS PO), Take  by mouth., Disp: , Rfl:   •  ondansetron (Zofran) 4 MG tablet, Take 1 tablet by mouth Every 8 (Eight) Hours As Needed for Nausea or Vomiting., Disp: 30 tablet, Rfl: 0  •  oxyCODONE-acetaminophen (PERCOCET) 5-325 MG per tablet, Take 1 tablet by mouth Every 4 (Four) Hours As Needed (Pain)., Disp: 42 tablet, Rfl: 0  •  pregabalin (Lyrica) 75 MG capsule, Take 1 capsule by mouth 2 (Two) Times a Day., Disp: 60 capsule, Rfl: 0  •  senna (senna) 8.6 MG tablet, Take 1 tablet by mouth Every Night., Disp: 20 tablet, Rfl: 0  •  TURMERIC PO, Take  by mouth Daily., Disp: , Rfl:   **I did not stop or change the above medications.  Patient's medication list was updated to reflect medications they have reported as currently taking, including medication changes made by other providers.    OBJECTIVE  Vitals:    08/11/21 0835   BP:  136/80   Pulse: 98   SpO2: 98%     Body mass index is 28.8 kg/m².    Diagnostics:    Laboratory Results:         Lab Results   Component Value Date    WBC 4.21 01/15/2021    HGB 14.5 01/15/2021    HCT 42.7 01/15/2021    MCV 96.8 01/15/2021     01/15/2021     Lab Results   Component Value Date    GLUCOSE 126 (H) 01/15/2021    BUN 11 01/15/2021    CREATININE 1.20 01/15/2021    EGFRIFNONA 65 01/15/2021    BCR 9.2 01/15/2021    K 4.3 01/15/2021    CO2 27.5 01/15/2021    CALCIUM 9.0 01/15/2021     No results found for: HGBA1C  Lab Results   Component Value Date    CHOL 123 05/05/2020    CHOL 191 01/22/2020     Lab Results   Component Value Date    HDL 49 05/05/2020    HDL 53 01/22/2020     Lab Results   Component Value Date    LDL 63 05/05/2020     (H) 01/22/2020     Lab Results   Component Value Date    TRIG 53 05/05/2020    TRIG 111 01/22/2020     No results found for: RPR  Lab Results   Component Value Date    TSH 1.180 01/22/2020     Lab Results   Component Value Date    KTOPTJGD29 734 01/22/2020       Physical Exam:  GENERAL: NAD  HEENT: Normocephalic, atraumatic   COR: RRR  Resp: Even and unlabored  Extremities: No signs of distal embolization.   Skin: Eczematous patches of BUE  Psychiatric: Normal mood and affect.    Neurological:   MS: AO. Language normal. No neglect. Recall (3/3). Follows all commands.  CN: II-XII grossly normal  Motor: Normal strength and tone throughout.  Sensory: Intact to light touch in arms and legs  Station and Gait: Normal gait and station.    Coordination: Normal finger to nose bilaterally    Impression/Plan:    1. TIA d/t vertebral artery dissection -- no recurrent symptoms  Continue ASA 81 mg  Continue Lipitor 10 mg, patient to follow-up with PCP for continued cholesterol surveillance and further statin refills  Monitor BP regularly, encouraged to stay well-hydrated  Dissection precautions --avoid chiropractic neck manipulations, roller coasters, etc.  Continue regular  physical activity, healthy diet  Secondary stroke prevention: Ideal targets for stroke prevention would be Blood pressure < 130/80; B12 > 500 TSH in normal range and LDL < 70; HbA1c < 6.5 and smoking cessation if applicable.  Call 911 for stroke symptoms  F/U prn      I spent a total of 20 minutes today in reviewing records, prior diagnostics, examination of patient as well as counseling and educating patient regarding diagnoses, symptoms, reviewing diagnostics with patient, pharmacologic treatment options including purpose, risk, benefits, possible side-effects, recommendations, lifestyle modifications, coordination of care and documenting plan of care.        Diagnoses and all orders for this visit:    1. TIA due to embolism (CMS/HCC) (Primary)    2. Vertebral artery dissection (CMS/HCC)    3. Mixed hyperlipidemia    Other orders  -     atorvastatin (LIPITOR) 10 MG tablet; Take 1 tablet by mouth Daily.  Dispense: 30 tablet; Refill: 11        Coding      Dictated using Dragon

## 2021-11-08 ENCOUNTER — OFFICE VISIT (OUTPATIENT)
Dept: ORTHOPEDIC SURGERY | Facility: CLINIC | Age: 46
End: 2021-11-08

## 2021-11-08 VITALS — BODY MASS INDEX: 27.4 KG/M2 | WEIGHT: 185 LBS | HEIGHT: 69 IN

## 2021-11-08 DIAGNOSIS — M75.01 ADHESIVE CAPSULITIS OF RIGHT SHOULDER: ICD-10-CM

## 2021-11-08 DIAGNOSIS — M25.511 RIGHT SHOULDER PAIN, UNSPECIFIED CHRONICITY: Primary | ICD-10-CM

## 2021-11-08 PROCEDURE — 73030 X-RAY EXAM OF SHOULDER: CPT | Performed by: ORTHOPAEDIC SURGERY

## 2021-11-08 PROCEDURE — 99214 OFFICE O/P EST MOD 30 MIN: CPT | Performed by: ORTHOPAEDIC SURGERY

## 2021-11-08 RX ORDER — METHYLPREDNISOLONE 4 MG/1
TABLET ORAL
COMMUNITY
Start: 2021-11-08

## 2021-11-08 RX ORDER — DUPILUMAB 300 MG/2ML
INJECTION, SOLUTION SUBCUTANEOUS
COMMUNITY
Start: 2021-11-03

## 2021-11-08 NOTE — PROGRESS NOTES
Subjective:     Patient ID: Jose Barber is a 46 y.o. male.    Chief Complaint:  Right shoulder pain, new issue  History of Present Illness  Jose Barber presents to clinic today for evaluation of right shoulder pain. He notes his shoulder has been popping for a while and there is tenderness. The patient said it has been intermittently sore and has worsened in the last 2 weeks. He shook someone's hand on 11/05/2021 and it was painful and weak. He then had some pain on 11/06/2021 and woke up at approximately 4:00 AM on 11/07/2021 with severe pain that made him believe he had dislocated it. He reports that the pain in his left shoulder was more severe than his right shoulder pain ever was. He states that he dislocated his left shoulder while playing basketball when he was a child.  He reports that he warmed up his left shoulder in a hot shower and donned ice prior to his appointment today. He reports that his left shoulder is currently the most mobile it has been in 2 days. He rate his pain level is a 9 out of 10, aching, sharp, and shooting in nature. He notes numbness and tingling in his right hand if lit is left in a stationary position. He denies having tenderness to palpation when his left upper extremity is stationary.     Social History     Occupational History   • Not on file   Tobacco Use   • Smoking status: Never Smoker   • Smokeless tobacco: Never Used   Vaping Use   • Vaping Use: Never used   Substance and Sexual Activity   • Alcohol use: Yes     Alcohol/week: 1.0 standard drink     Types: 1 Cans of beer per week     Comment: occasional   • Drug use: Never   • Sexual activity: Yes      Past Medical History:   Diagnosis Date   • Elevated cholesterol    • Stroke (HCC)     TIA   • TIA (transient ischemic attack)     3 years ago     Past Surgical History:   Procedure Laterality Date   • BASAL CELL CARCINOMA EXCISION     • KNEE ARTHROSCOPY W/ ACL RECONSTRUCTION     • SHOULDER ARTHROSCOPY W/ ROTATOR CUFF  "REPAIR Left 1/20/2021    Procedure: SHOULDER ARTHROSCOPY, rotator cuff repair with bio-inductive implant, open biceps tenodesis, extensive debridement;  Surgeon: Pete Zuñiga MD;  Location: [facility];  Service: Orthopedics;  Laterality: Left;       Family History   Problem Relation Age of Onset   • Melanoma Maternal Grandmother    • Cancer Maternal Grandmother    • Heart attack Maternal Grandfather    • Heart attack Paternal Grandfather    • Cancer Paternal Grandfather    • Migraines Mother          Review of Systems        Objective:  Vitals:    11/08/21 1545   Weight: 83.9 kg (185 lb)   Height: 175.3 cm (69\")         11/08/21  1545   Weight: 83.9 kg (185 lb)     Body mass index is 27.32 kg/m².  Physical Exam    Vital signs reviewed.   General: No acute distress, alert and oriented  Eyes: conjunctiva clear; pupils equally round and reactive  ENT: external ears and nose atraumatic; oropharynx clear  CV: no peripheral edema  Resp: normal respiratory effort  Skin: no rashes or wounds; normal turgor  Psych: mood and affect appropriate; recent and remote memory intact          Ortho Exam       Right shoulder-  FF-   Active-  70 degrees with pain lowering the extremity, passive forward flexion 75 degrees  Extension-   40 degrees   ER-      Active- neutral  passive external rotation 40 degrees  Belly Press-   2/5 with increased pain over anterior glenohumeral joint line in the region of the subscapularis.    Positive deltoid firing.     Wrist and finger flexion 5/5    2+ radial pulse.   Positive sensation.   Skin is intact. No rashes.     Imaging:  Right Shoulder X-Ray  Indication: Pain  AP, scapular Y, and axillary lateral views    Findings:  No fracture  No bony lesion  Normal soft tissues  Mild glenohumeral joint space narrowing with no significant humeral head elevation, mild degenerative change AC joint    No prior studies were available for comparison.    Assessment:        1. Right shoulder pain, unspecified " chronicity    2. Adhesive capsulitis of right shoulder           Plan:          1. Discussed treatment options at length with patient at today's visit. I recommended an MRI for further evaluation given significant limitation in regards to his range of motion and his strength at this point time. He does certainly have stiffness associated with passive motion as well which is concerning for adhesive capsulitis but his weakness is concerning for possible structural issues in regards to his rotator cuff.  2. Continue Medrol Dosepak given to him by outside physician  3. 2. Follow up: He will follow up after the MRI.      Jose Barber was in agreement with plan and had all questions answered.     Orders:  Orders Placed This Encounter   Procedures   • XR Shoulder 2+ View Right   • MRI Shoulder Right Without Contrast       Medications:  No orders of the defined types were placed in this encounter.      Followup:  Return for review of MRI results.    Diagnoses and all orders for this visit:    1. Right shoulder pain, unspecified chronicity (Primary)  -     XR Shoulder 2+ View Right  -     MRI Shoulder Right Without Contrast; Future    2. Adhesive capsulitis of right shoulder  -     MRI Shoulder Right Without Contrast; Future          Dictated utilizing Dragon dictation     Transcribed from ambient dictation for Pete Zuñiga MD by Cecilia Bender.  11/08/21   19:59 EST    Patient verbalized consent to the visit recording.  I have personally performed the services described in this document as transcribed by the above individual, and it is both accurate and complete.  Pete Zuñiga MD  11/9/2021  22:38 EST

## 2021-11-10 ENCOUNTER — TELEPHONE (OUTPATIENT)
Dept: ORTHOPEDIC SURGERY | Facility: CLINIC | Age: 46
End: 2021-11-10

## 2021-11-10 NOTE — TELEPHONE ENCOUNTER
Caller: Jose Barber    Relationship to patient: Self    Best call back number:[phone]    Patient is needing: FYI: PT CALLING BACK TO LET DR. EWING KNOW THAT HIS MRI APPT IS AT [email or web address] Monday 11/15 @  2:30PM

## 2021-11-17 ENCOUNTER — OFFICE VISIT (OUTPATIENT)
Dept: ORTHOPEDIC SURGERY | Facility: CLINIC | Age: 46
End: 2021-11-17

## 2021-11-17 VITALS — WEIGHT: 185 LBS | HEIGHT: 69 IN | BODY MASS INDEX: 27.4 KG/M2

## 2021-11-17 DIAGNOSIS — M75.41 SUBACROMIAL IMPINGEMENT OF RIGHT SHOULDER: ICD-10-CM

## 2021-11-17 DIAGNOSIS — S43.431A SUPERIOR GLENOID LABRUM LESION OF RIGHT SHOULDER, INITIAL ENCOUNTER: ICD-10-CM

## 2021-11-17 DIAGNOSIS — M75.111 INCOMPLETE TEAR OF RIGHT ROTATOR CUFF, UNSPECIFIED WHETHER TRAUMATIC: Primary | ICD-10-CM

## 2021-11-17 PROCEDURE — 99213 OFFICE O/P EST LOW 20 MIN: CPT | Performed by: ORTHOPAEDIC SURGERY

## 2021-11-23 ENCOUNTER — APPOINTMENT (OUTPATIENT)
Dept: MRI IMAGING | Facility: HOSPITAL | Age: 46
End: 2021-11-23

## 2022-08-23 RX ORDER — ATORVASTATIN CALCIUM 10 MG/1
10 TABLET, FILM COATED ORAL DAILY
Qty: 30 TABLET | Refills: 11 | Status: SHIPPED | OUTPATIENT
Start: 2022-08-23

## 2023-08-20 RX ORDER — ATORVASTATIN CALCIUM 10 MG/1
TABLET, FILM COATED ORAL
Qty: 90 TABLET | OUTPATIENT
Start: 2023-08-20

## 2024-02-21 ENCOUNTER — OFFICE VISIT (OUTPATIENT)
Dept: SPORTS MEDICINE | Facility: CLINIC | Age: 49
End: 2024-02-21
Payer: COMMERCIAL

## 2024-02-21 VITALS
HEART RATE: 59 BPM | SYSTOLIC BLOOD PRESSURE: 122 MMHG | BODY MASS INDEX: 28.14 KG/M2 | DIASTOLIC BLOOD PRESSURE: 80 MMHG | OXYGEN SATURATION: 99 % | WEIGHT: 190 LBS | RESPIRATION RATE: 16 BRPM | TEMPERATURE: 98 F | HEIGHT: 69 IN

## 2024-02-21 DIAGNOSIS — M25.511 ACUTE PAIN OF RIGHT SHOULDER: ICD-10-CM

## 2024-02-21 DIAGNOSIS — M25.521 RIGHT ELBOW PAIN: ICD-10-CM

## 2024-02-21 DIAGNOSIS — G56.11 PRONATOR TERES SYNDROME OF RIGHT UPPER EXTREMITY: Primary | ICD-10-CM

## 2024-02-21 PROCEDURE — 99204 OFFICE O/P NEW MOD 45 MIN: CPT | Performed by: FAMILY MEDICINE

## 2024-02-21 RX ORDER — METHYLPREDNISOLONE 4 MG/1
TABLET ORAL
Qty: 21 TABLET | Refills: 0 | Status: SHIPPED | OUTPATIENT
Start: 2024-02-21

## 2024-02-21 NOTE — PROGRESS NOTES
"Jose is a 48 y.o. year old male presents to [facility]    Chief Complaint   Patient presents with    Arm Pain     Right arm pain after FOOSH injury one month ago        History of Present Illness  Reestablish care.  Approximately 6 weeks ago, he was getting out of his work truck and he slipped on ice.  He states that his right arm was outstretched and he was trying to prevent a fall backwards and though he felt a tug in his elbow and shoulder.  His primary pain has been at the elbow but over the past 24 hours he has noticed pain in the anterior shoulder.  Pain in the elbow is primarily along the inside and the front of the elbow.  This does interfere with  strength slightly.  At times, has associated numbness down into the hand.    I have reviewed the patient's medical, family, and social history in detail and updated the computerized patient record.    /80 (BP Location: Left arm, Patient Position: Sitting, Cuff Size: Adult)   Pulse 59   Temp 98 °F (36.7 °C)   Resp 16   Ht 175.3 cm (69\")   Wt 86.2 kg (190 lb)   SpO2 99%   BMI 28.06 kg/m²      Physical Exam    Vital signs reviewed.   General: No acute distress.  Eyes: conjunctiva clear; pupils equally round and reactive  ENT: external ears atraumatic  CV: no peripheral edema  Resp: normal respiratory effort, no use of accessory muscles  Skin: no rashes or wounds; normal turgor  Psych: mood and affect appropriate; recent and remote memory intact  Neuro: sensation to light touch intact    MSK Exam  R shoulder: Negative drop arm.  Negative empty can.  Negative Johns, negative Neer sign.  Equivocal Speed test.  R elbow: Full range of motion.  No varus no valgus laxity.  There is no tenderness along the medial nor lateral epicondyle.  There is tenderness along the muscle belly of the pronator teres.  There is no pain with resisted wrist flexion, middle finger flexion.  Minimal pain with resisted wrist pronation, none " with supination.    Right Elbow X-Ray  Indication: Pain  Views: AP and Lateral views    Findings:  No fracture  No bony lesion  Normal soft tissues  Normal joint spaces    No prior studies were available for comparison.             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    Right elbow pain  -     XR Elbow 3+ View Right  -     methylPREDNISolone (MEDROL) 4 MG dose pack; Take as directed on package instructions.    Acute pain of right shoulder      Discussed differential of his shoulder and elbow pain status post fall.  He is having symptoms more consistent with muscle strain, pronator teres suspected.  Trial of Medrol Dosepak, home exercises.  Follow-up in 4 weeks for repeat evaluation.          Follow Up     Return in about 4 weeks (around 3/20/2024) for Recheck.    Patient was given instructions and counseling regarding his condition or for health maintenance advice. Please see specific information pulled into the AVS if appropriate.     EMR Dragon/Transcription disclaimer:    Much of this encounter note is an electronic transcription/translation of spoken language to printed text.  The electronic translation of spoken language may permit erroneous, or at times, nonsensical words or phrases to be inadvertently transcribed.  Although I have reviewed the note for such errors some may still exist.

## 2024-02-22 ENCOUNTER — PATIENT ROUNDING (BHMG ONLY) (OUTPATIENT)
Dept: SPORTS MEDICINE | Facility: CLINIC | Age: 49
End: 2024-02-22
Payer: COMMERCIAL

## 2024-02-22 NOTE — PROGRESS NOTES
February 22, 2024    A [employer] Message has been sent to the patient for PATIENT ROUNDING with [facility]

## 2024-03-20 ENCOUNTER — OFFICE VISIT (OUTPATIENT)
Dept: SPORTS MEDICINE | Facility: CLINIC | Age: 49
End: 2024-03-20
Payer: COMMERCIAL

## 2024-03-20 VITALS
SYSTOLIC BLOOD PRESSURE: 122 MMHG | BODY MASS INDEX: 28.14 KG/M2 | OXYGEN SATURATION: 99 % | WEIGHT: 190 LBS | HEIGHT: 69 IN | RESPIRATION RATE: 16 BRPM | HEART RATE: 74 BPM | DIASTOLIC BLOOD PRESSURE: 70 MMHG

## 2024-03-20 DIAGNOSIS — M25.521 RIGHT ELBOW PAIN: Primary | ICD-10-CM

## 2024-03-20 DIAGNOSIS — M77.11 RIGHT LATERAL EPICONDYLITIS: ICD-10-CM

## 2024-03-20 DIAGNOSIS — G56.11 PRONATOR TERES SYNDROME OF RIGHT UPPER EXTREMITY: ICD-10-CM

## 2024-03-20 PROCEDURE — 99214 OFFICE O/P EST MOD 30 MIN: CPT | Performed by: FAMILY MEDICINE

## 2024-03-20 NOTE — PROGRESS NOTES
"Jose is a 48 y.o. year old male presents to [facility]    Chief Complaint   Patient presents with    Right Elbow - Follow-up, Pain     F/u eval for RT elbow pain with pronator teres syndrome of right upper extremity - reports steroid pack helped for a while then pain returned - here for further evaluation and treatment        History of Present Illness  History of Present Illness  The patient presents for evaluation of right shoulder and elbow pain.    The steroids worked well for the first 1.5 weeks. He tries not to do anything, but it aggravates a little bit. He still has mild pain. The more he uses his elbow, the more pain he has. He was squeezing the left hand in the window on Saturday. Yesterday, he had severe pain and could not do anything. Today, it is not a bad day, but he can feel tenderness there. He has laid off everything for the last month and has not been to the gym. He will lift a little weight or do something stupid. He is doing better. He has pain with gripping since the fall. He has been wearing a compression sleeve. He did not get the other one because he has eczema. He does not have a low threshold for pain, so he can manage it. When he first came in, he could hardly lift his arm for days.    I have reviewed the patient's medical, family, and social history in detail and updated the computerized patient record.    /70 (BP Location: Right arm, Patient Position: Sitting, Cuff Size: Large Adult)   Pulse 74   Resp 16   Ht 175.3 cm (69.02\")   Wt 86.2 kg (190 lb)   SpO2 99%   BMI 28.05 kg/m²      Physical Exam    Vital signs reviewed.   General: No acute distress.  Eyes: conjunctiva clear; pupils equally round and reactive  ENT: external ears atraumatic  CV: no peripheral edema  Resp: normal respiratory effort, no use of accessory muscles  Skin: no rashes or wounds; normal turgor  Psych: mood and affect appropriate; recent and remote memory intact  Neuro: " sensation to light touch intact    MSK Exam  Physical Exam  Right shoulder demonstrates full range of motion. Negative drop arm, negative empty can, negative Johns and negative Neer's sign. Right elbow demonstrates full range of motion. There is tenderness along the lateral epicondyle, common extensor tendon that is worsened with resisted wrist extension and resisted pronation. There is tenderness along the pronator teres muscle belly. There is no tenderness along the medial epicondyle.          Results           Diagnoses and all orders for this visit:    Right elbow pain  -     Ibuprofen 3 %, Gabapentin 10 %, Baclofen 2 %, lidocaine 4 %; Apply 1-2 g topically to the appropriate area as directed 3 (Three) to 4 (Four) times daily.    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    Right lateral epicondylitis  -     Ibuprofen 3 %, Gabapentin 10 %, Baclofen 2 %, lidocaine 4 %; Apply 1-2 g topically to the appropriate area as directed 3 (Three) to 4 (Four) times daily.      Assessment & Plan  1. Right shoulder and elbow pain.  He probably has tendinitis, pronator, and tennis elbow. His shoulder feels good. He had a good response to the steroids. We discussed treatment options including cortisone injection, another round of steroids, and continued intermittent use of the elbow strap when he is active. I will send in a prescription for an anti-inflammatory cream to be used once a day up to 4 times a day. He will continue exercises. If his symptoms do not improve in 4 weeks, we will consider an MRI.          Follow Up     No follow-ups on file.    Patient was given instructions and counseling regarding his condition or for health maintenance advice. Please see specific information pulled into the AVS if appropriate.     Patient or patient representative  verbalized consent for the use of Ambient Listening during the visit with  MONE Chaidez Jr., DO for chart documentation. 3/20/2024  12:06 EDT

## 2024-11-22 ENCOUNTER — OFFICE VISIT (OUTPATIENT)
Dept: SPORTS MEDICINE | Facility: CLINIC | Age: 49
End: 2024-11-22
Payer: COMMERCIAL

## 2024-11-22 VITALS
BODY MASS INDEX: 28.14 KG/M2 | TEMPERATURE: 97.8 F | SYSTOLIC BLOOD PRESSURE: 126 MMHG | HEART RATE: 83 BPM | DIASTOLIC BLOOD PRESSURE: 86 MMHG | HEIGHT: 69 IN | RESPIRATION RATE: 16 BRPM | WEIGHT: 190 LBS | OXYGEN SATURATION: 98 %

## 2024-11-22 DIAGNOSIS — M54.50 LUMBAR PAIN: Primary | ICD-10-CM

## 2024-11-22 DIAGNOSIS — M51.361 DEGENERATION OF INTERVERTEBRAL DISC OF LUMBAR REGION WITH LOWER EXTREMITY PAIN: ICD-10-CM

## 2024-11-22 DIAGNOSIS — M54.16 RIGHT LUMBAR RADICULITIS: ICD-10-CM

## 2024-11-22 PROCEDURE — 99214 OFFICE O/P EST MOD 30 MIN: CPT | Performed by: FAMILY MEDICINE

## 2024-11-22 RX ORDER — PREDNISONE 20 MG/1
TABLET ORAL
Qty: 20 TABLET | Refills: 0 | Status: SHIPPED | OUTPATIENT
Start: 2024-11-22

## 2024-11-22 NOTE — PROGRESS NOTES
"Jose is a 49 y.o. year old male presents to [facility]    Chief Complaint   Patient presents with    Back Pain     Lower back pain and radiating pain down his right leg to his knee for a week. Pt states pain started after lifting weights wrong.        History of Present Illness  History of Present Illness  The patient presents for evaluation of back pain.    He reports experiencing a shooting pain that extends down his leg, which began after he felt a popping sensation during a workout two weeks ago. He describes the pain as similar to a muscle pull. Prolonged standing exacerbates the discomfort in his lower back. He has no prior history of back issues. He attempted to alleviate the pain with Aleve, which provided some relief. He also mentions a feeling of numbness in his legs.    I have reviewed the patient's medical, family, and social history in detail and updated the computerized patient record.    /86 (BP Location: Left arm, Patient Position: Sitting, Cuff Size: Adult)   Pulse 83   Temp 97.8 °F (36.6 °C)   Resp 16   Ht 175.3 cm (69.02\")   Wt 86.2 kg (190 lb)   SpO2 98%   BMI 28.04 kg/m²      Physical Exam    Vital signs reviewed.   General: No acute distress.  Eyes: conjunctiva clear; pupils equally round and reactive  ENT: external ears atraumatic  CV: no peripheral edema  Resp: normal respiratory effort, no use of accessory muscles  Skin: no rashes or wounds; normal turgor  Psych: mood and affect appropriate; recent and remote memory intact  Neuro: sensation to light touch intact    MSK Exam  Physical Exam  Lumbar spine shows positive straight leg raise on right, negative on the left. Sensation to light touch intact in bilateral lower extremities. 1+ DTR, L4 S1 bilateral. Gait is normal.      Lumbar Spine X-Ray  Indication: Pain  Views: AP and Lateral    Findings:  No fracture  No bony lesion  Normal soft tissues  Minimal multilevel DDD     No prior studies were " available for comparison.\      Results  Imaging  X-ray of the back shows no slippage of the bones.         Diagnoses and all orders for this visit:    Lumbar pain  -     XR Spine Lumbar 2 or 3 View    Degeneration of intervertebral disc of lumbar region with lower extremity pain    Right lumbar radiculitis    Other orders  -     predniSONE (DELTASONE) 20 MG tablet; Take 3 tabs daily for 3 d, then take 2 tabs daily for 3 d, then take 1 tab daily for 3 d, then take 0.5 tab daily for 3 d then stop      Assessment & Plan  1. Back pain.  The patient's back pain is likely due to a disc irritating the nerve. The x-ray results are reassuring, indicating no slippage of the bones. A conservative approach will be taken initially. A 12-day course of steroids will be prescribed. Home exercises will be provided to aid in recovery. If symptoms persist after the steroid taper, the prescription will be renewed. Should the current treatment plan prove ineffective, alternative medications such as gabapentin will be considered. If symptoms persist over the next few weeks, an MRI of the back and formal physical therapy may be necessary.            Follow Up     Patient was given instructions and counseling regarding his condition or for health maintenance advice. Please see specific information pulled into the AVS if appropriate.     Patient or patient representative verbalized consent for the use of Ambient Listening during the visit with  MONE Chaidez Jr., DO for chart documentation. 11/22/2024  10:21 EST

## (undated) DEVICE — SUT PDS 0 CT1 36IN Z346H

## (undated) DEVICE — CLEAR-TRAC DISPOSABLE STOPCOCK: Brand: CLEAR-TRAC

## (undated) DEVICE — PK SHLDR ARTHSCP 90

## (undated) DEVICE — SOL ISO/ALC RUB 70PCT 4OZ

## (undated) DEVICE — GLV SURG SENSICARE PI PF LF 7 GRN STRL

## (undated) DEVICE — 1010 S-DRAPE TOWEL DRAPE 10/BX: Brand: STERI-DRAPE™

## (undated) DEVICE — SUT VIC 3/0 SH CR8 18IN J864D

## (undated) DEVICE — NDL HYPO SFTY GLD 22G 1 1/2IN

## (undated) DEVICE — DECANTER: Brand: UNBRANDED

## (undated) DEVICE — DRSNG TELFA PAD NONADH STR 1S 3X8IN

## (undated) DEVICE — ST TB GOFLO STRL

## (undated) DEVICE — CANNULA THREADED FLEX 8.0 X 72MM: Brand: CLEAR-TRAC

## (undated) DEVICE — WEREWOLF FLOW 90 COBLATION WAND: Brand: COBLATION

## (undated) DEVICE — INTENT TO BE USED WITH SUTURE MATERIAL FOR TISSUE CLOSURE: Brand: RICHARD-ALLAN® NEEDLE 1/2 CIRCLE TAPER

## (undated) DEVICE — SUT MNCRYL 4/0 PS2 18 IN

## (undated) DEVICE — TOWEL,OR,DSP,ST,BLUE,STD,4/PK,20PK/CS: Brand: MEDLINE

## (undated) DEVICE — SPNG GZ WOVN 4X4IN 12PLY 10/BX STRL

## (undated) DEVICE — SYR LUERLOK 50ML

## (undated) DEVICE — Q-FIX REUSABLE 2.8MM PATHFINDER OBTURATOR: Brand: Q-FIX

## (undated) DEVICE — PREP SOL POVIDONE/IODINE BT 4OZ

## (undated) DEVICE — SHOULDER STABILIZATION KIT,                                    DISPOSABLE 12 PER BOX

## (undated) DEVICE — ACCU-PASS SUTURE SHUTTLE 45                                    DEGREE, RIGHT, STERILE: Brand: ACCU-PASS

## (undated) DEVICE — ACCU-PASS SUTURE SHUTTLE 45                                    DEGREE, LEFT, STERILE: Brand: ACCU-PASS

## (undated) DEVICE — DRSNG SURESITE WNDW 4X4.5

## (undated) DEVICE — FIRSTPASS ST SUTURE PASSER, SELF CAPTURE: Brand: FIRSTPASS

## (undated) DEVICE — T-MAX DISPOSABLE FACE MASK 8 PER BOX

## (undated) DEVICE — 3M™ MEDIPORE™ H SOFT CLOTH SURGICAL TAPE 2864, 4 INCH X 10 YARD (10CM X 9,14M), 12 ROLLS/CASE: Brand: 3M™ MEDIPORE™

## (undated) DEVICE — SYS SKIN CLS DERMABOND PRINEO W/22CM MESH TP

## (undated) DEVICE — 4.5 FULL RADIUS BONECUTTER BLADES,                                    YELLOW, 8000 MAXIMUM RPM, PACKAGED 6                                    PER BOX, STERILE

## (undated) DEVICE — SUT ETHLN 3/0 PS2 18 IN 1669H

## (undated) DEVICE — DECANT BG O JET

## (undated) DEVICE — 3M™ STERI-DRAPE™ U-DRAPE 1015: Brand: STERI-DRAPE™

## (undated) DEVICE — SYR LUERLOK 20CC BX/50

## (undated) DEVICE — CANNULA THREADED FLEX 5.5 X 72MM: Brand: CLEAR-TRAC

## (undated) DEVICE — 3M™ STERI-STRIP™ REINFORCED ADHESIVE SKIN CLOSURES, R1547, 1/2 IN X 4 IN (12 MM X 100 MM), 6 STRIPS/ENVELOPE: Brand: 3M™ STERI-STRIP™

## (undated) DEVICE — GLV SURG SENSICARE PI LF PF 7.0

## (undated) DEVICE — GLV SURG NEOLON 2G PF LF 7.5 STRL

## (undated) DEVICE — ADAPT DB SPIKE 2PCT FOR AR6400 TBG

## (undated) DEVICE — APPL CHLORAPREP HI/LITE 26ML ORNG